# Patient Record
Sex: MALE | Race: WHITE | Employment: FULL TIME | URBAN - METROPOLITAN AREA
[De-identification: names, ages, dates, MRNs, and addresses within clinical notes are randomized per-mention and may not be internally consistent; named-entity substitution may affect disease eponyms.]

---

## 2019-12-09 ENCOUNTER — OFFICE VISIT (OUTPATIENT)
Dept: FAMILY MEDICINE CLINIC | Age: 54
End: 2019-12-09
Payer: COMMERCIAL

## 2019-12-09 VITALS
HEART RATE: 104 BPM | WEIGHT: 219 LBS | OXYGEN SATURATION: 98 % | DIASTOLIC BLOOD PRESSURE: 88 MMHG | HEIGHT: 73 IN | BODY MASS INDEX: 29.03 KG/M2 | SYSTOLIC BLOOD PRESSURE: 138 MMHG

## 2019-12-09 DIAGNOSIS — R00.2 HEART PALPITATIONS: Primary | ICD-10-CM

## 2019-12-09 DIAGNOSIS — G47.33 OSA (OBSTRUCTIVE SLEEP APNEA): ICD-10-CM

## 2019-12-09 DIAGNOSIS — E78.00 PURE HYPERCHOLESTEROLEMIA: ICD-10-CM

## 2019-12-09 DIAGNOSIS — R00.2 PALPITATION: ICD-10-CM

## 2019-12-09 DIAGNOSIS — R53.83 FATIGUE, UNSPECIFIED TYPE: ICD-10-CM

## 2019-12-09 PROCEDURE — 93000 ELECTROCARDIOGRAM COMPLETE: CPT | Performed by: FAMILY MEDICINE

## 2019-12-09 PROCEDURE — 36415 COLL VENOUS BLD VENIPUNCTURE: CPT | Performed by: FAMILY MEDICINE

## 2019-12-09 PROCEDURE — 99203 OFFICE O/P NEW LOW 30 MIN: CPT | Performed by: FAMILY MEDICINE

## 2019-12-09 PROCEDURE — G0444 DEPRESSION SCREEN ANNUAL: HCPCS | Performed by: FAMILY MEDICINE

## 2019-12-09 ASSESSMENT — ENCOUNTER SYMPTOMS
RHINORRHEA: 0
COUGH: 0
BLOOD IN STOOL: 0
EYE PAIN: 0
SINUS PRESSURE: 0
CONSTIPATION: 0
WHEEZING: 0
DIARRHEA: 0
ABDOMINAL PAIN: 0
CHEST TIGHTNESS: 0
SHORTNESS OF BREATH: 0
VOMITING: 0
EYE DISCHARGE: 0

## 2019-12-09 ASSESSMENT — PATIENT HEALTH QUESTIONNAIRE - PHQ9
7. TROUBLE CONCENTRATING ON THINGS, SUCH AS READING THE NEWSPAPER OR WATCHING TELEVISION: 0
SUM OF ALL RESPONSES TO PHQ QUESTIONS 1-9: 10
SUM OF ALL RESPONSES TO PHQ9 QUESTIONS 1 & 2: 4
9. THOUGHTS THAT YOU WOULD BE BETTER OFF DEAD, OR OF HURTING YOURSELF: 0
10. IF YOU CHECKED OFF ANY PROBLEMS, HOW DIFFICULT HAVE THESE PROBLEMS MADE IT FOR YOU TO DO YOUR WORK, TAKE CARE OF THINGS AT HOME, OR GET ALONG WITH OTHER PEOPLE: 0
4. FEELING TIRED OR HAVING LITTLE ENERGY: 3
2. FEELING DOWN, DEPRESSED OR HOPELESS: 3
SUM OF ALL RESPONSES TO PHQ QUESTIONS 1-9: 10
1. LITTLE INTEREST OR PLEASURE IN DOING THINGS: 1
3. TROUBLE FALLING OR STAYING ASLEEP: 3
6. FEELING BAD ABOUT YOURSELF - OR THAT YOU ARE A FAILURE OR HAVE LET YOURSELF OR YOUR FAMILY DOWN: 0
5. POOR APPETITE OR OVEREATING: 0
8. MOVING OR SPEAKING SO SLOWLY THAT OTHER PEOPLE COULD HAVE NOTICED. OR THE OPPOSITE, BEING SO FIGETY OR RESTLESS THAT YOU HAVE BEEN MOVING AROUND A LOT MORE THAN USUAL: 0

## 2019-12-13 ENCOUNTER — NURSE ONLY (OUTPATIENT)
Dept: FAMILY MEDICINE CLINIC | Age: 54
End: 2019-12-13
Payer: COMMERCIAL

## 2019-12-13 DIAGNOSIS — E78.00 PURE HYPERCHOLESTEROLEMIA: ICD-10-CM

## 2019-12-13 PROBLEM — R53.83 FATIGUE: Status: ACTIVE | Noted: 2019-12-13

## 2019-12-13 PROBLEM — R00.2 HEART PALPITATIONS: Status: ACTIVE | Noted: 2019-12-13

## 2019-12-13 LAB
CHOLESTEROL, TOTAL: 327 MG/DL (ref 0–199)
HDLC SERPL-MCNC: 47 MG/DL (ref 40–60)
LDL CHOLESTEROL CALCULATED: ABNORMAL MG/DL
LDL CHOLESTEROL DIRECT: 221 MG/DL
TRIGL SERPL-MCNC: 334 MG/DL (ref 0–150)
VLDLC SERPL CALC-MCNC: ABNORMAL MG/DL

## 2019-12-13 PROCEDURE — 36415 COLL VENOUS BLD VENIPUNCTURE: CPT | Performed by: FAMILY MEDICINE

## 2019-12-19 ENCOUNTER — OFFICE VISIT (OUTPATIENT)
Dept: FAMILY MEDICINE CLINIC | Age: 54
End: 2019-12-19
Payer: COMMERCIAL

## 2019-12-19 ENCOUNTER — OFFICE VISIT (OUTPATIENT)
Dept: CARDIOLOGY CLINIC | Age: 54
End: 2019-12-19
Payer: COMMERCIAL

## 2019-12-19 ENCOUNTER — TELEPHONE (OUTPATIENT)
Dept: CARDIOLOGY CLINIC | Age: 54
End: 2019-12-19

## 2019-12-19 VITALS
HEIGHT: 72 IN | WEIGHT: 219.8 LBS | SYSTOLIC BLOOD PRESSURE: 132 MMHG | DIASTOLIC BLOOD PRESSURE: 88 MMHG | OXYGEN SATURATION: 99 % | BODY MASS INDEX: 29.77 KG/M2 | HEART RATE: 102 BPM

## 2019-12-19 VITALS
HEART RATE: 111 BPM | BODY MASS INDEX: 29.82 KG/M2 | DIASTOLIC BLOOD PRESSURE: 86 MMHG | SYSTOLIC BLOOD PRESSURE: 124 MMHG | HEIGHT: 72 IN | WEIGHT: 220.2 LBS | OXYGEN SATURATION: 98 %

## 2019-12-19 DIAGNOSIS — R07.2 PRECORDIAL PAIN: Primary | ICD-10-CM

## 2019-12-19 DIAGNOSIS — R73.01 IFG (IMPAIRED FASTING GLUCOSE): ICD-10-CM

## 2019-12-19 DIAGNOSIS — R00.2 PALPITATIONS: ICD-10-CM

## 2019-12-19 DIAGNOSIS — R53.83 FATIGUE, UNSPECIFIED TYPE: ICD-10-CM

## 2019-12-19 DIAGNOSIS — R06.02 SOB (SHORTNESS OF BREATH): ICD-10-CM

## 2019-12-19 DIAGNOSIS — E78.01 FAMILIAL HYPERCHOLESTEROLEMIA: Primary | ICD-10-CM

## 2019-12-19 DIAGNOSIS — Z12.5 SCREENING PSA (PROSTATE SPECIFIC ANTIGEN): ICD-10-CM

## 2019-12-19 LAB
A/G RATIO: 1.5 (ref 1.1–2.2)
ALBUMIN SERPL-MCNC: 4.6 G/DL (ref 3.4–5)
ALP BLD-CCNC: 82 U/L (ref 40–129)
ALT SERPL-CCNC: 21 U/L (ref 10–40)
ANION GAP SERPL CALCULATED.3IONS-SCNC: 18 MMOL/L (ref 3–16)
AST SERPL-CCNC: 24 U/L (ref 15–37)
BILIRUB SERPL-MCNC: 0.3 MG/DL (ref 0–1)
BUN BLDV-MCNC: 16 MG/DL (ref 7–20)
CALCIUM SERPL-MCNC: 10.4 MG/DL (ref 8.3–10.6)
CHLORIDE BLD-SCNC: 94 MMOL/L (ref 99–110)
CO2: 24 MMOL/L (ref 21–32)
CREAT SERPL-MCNC: 0.9 MG/DL (ref 0.9–1.3)
GFR AFRICAN AMERICAN: >60
GFR NON-AFRICAN AMERICAN: >60
GLOBULIN: 3 G/DL
GLUCOSE BLD-MCNC: 523 MG/DL (ref 70–99)
HCT VFR BLD CALC: 45.7 % (ref 40.5–52.5)
HEMOGLOBIN: 15.5 G/DL (ref 13.5–17.5)
MCH RBC QN AUTO: 29.5 PG (ref 26–34)
MCHC RBC AUTO-ENTMCNC: 33.9 G/DL (ref 31–36)
MCV RBC AUTO: 87 FL (ref 80–100)
PDW BLD-RTO: 13.3 % (ref 12.4–15.4)
PLATELET # BLD: 254 K/UL (ref 135–450)
PMV BLD AUTO: 9.1 FL (ref 5–10.5)
POTASSIUM SERPL-SCNC: 4.6 MMOL/L (ref 3.5–5.1)
PROSTATE SPECIFIC ANTIGEN: 0.67 NG/ML (ref 0–4)
RBC # BLD: 5.25 M/UL (ref 4.2–5.9)
SODIUM BLD-SCNC: 136 MMOL/L (ref 136–145)
T4 FREE: 1.3 NG/DL (ref 0.9–1.8)
TOTAL PROTEIN: 7.6 G/DL (ref 6.4–8.2)
TSH REFLEX: 4.41 UIU/ML (ref 0.27–4.2)
WBC # BLD: 4.5 K/UL (ref 4–11)

## 2019-12-19 PROCEDURE — 0296T PR EXT ECG > 48HR TO 21 DAY RCRD W/CONECT INTL RCRD: CPT | Performed by: INTERNAL MEDICINE

## 2019-12-19 PROCEDURE — 36415 COLL VENOUS BLD VENIPUNCTURE: CPT | Performed by: FAMILY MEDICINE

## 2019-12-19 PROCEDURE — 99213 OFFICE O/P EST LOW 20 MIN: CPT | Performed by: FAMILY MEDICINE

## 2019-12-19 PROCEDURE — 99204 OFFICE O/P NEW MOD 45 MIN: CPT | Performed by: INTERNAL MEDICINE

## 2019-12-19 RX ORDER — ATORVASTATIN CALCIUM 20 MG/1
20 TABLET, FILM COATED ORAL DAILY
Qty: 30 TABLET | Refills: 2 | Status: SHIPPED | OUTPATIENT
Start: 2019-12-19 | End: 2020-01-08 | Stop reason: SDUPTHER

## 2019-12-19 ASSESSMENT — ENCOUNTER SYMPTOMS: SHORTNESS OF BREATH: 1

## 2019-12-20 LAB
ESTIMATED AVERAGE GLUCOSE: 366.6 MG/DL
HBA1C MFR BLD: 14.4 %

## 2019-12-20 RX ORDER — METFORMIN HYDROCHLORIDE 500 MG/1
500 TABLET, EXTENDED RELEASE ORAL 2 TIMES DAILY WITH MEALS
Qty: 60 TABLET | Refills: 1 | Status: SHIPPED | OUTPATIENT
Start: 2019-12-20 | End: 2020-01-08 | Stop reason: SDUPTHER

## 2020-01-03 PROCEDURE — 0298T PR EXT ECG > 48HR TO 21 DAY REVIEW AND INTERPRETATN: CPT | Performed by: INTERNAL MEDICINE

## 2020-01-08 ENCOUNTER — OFFICE VISIT (OUTPATIENT)
Dept: FAMILY MEDICINE CLINIC | Age: 55
End: 2020-01-08
Payer: COMMERCIAL

## 2020-01-08 VITALS
SYSTOLIC BLOOD PRESSURE: 122 MMHG | OXYGEN SATURATION: 99 % | BODY MASS INDEX: 29.96 KG/M2 | HEIGHT: 72 IN | HEART RATE: 104 BPM | DIASTOLIC BLOOD PRESSURE: 84 MMHG | WEIGHT: 221.2 LBS

## 2020-01-08 PROBLEM — E11.65 UNCONTROLLED TYPE 2 DIABETES MELLITUS WITH HYPERGLYCEMIA (HCC): Status: ACTIVE | Noted: 2020-01-08

## 2020-01-08 PROCEDURE — 99213 OFFICE O/P EST LOW 20 MIN: CPT | Performed by: FAMILY MEDICINE

## 2020-01-08 RX ORDER — BLOOD-GLUCOSE METER
KIT MISCELLANEOUS
Qty: 1 KIT | Refills: 0 | Status: SHIPPED | OUTPATIENT
Start: 2020-01-08

## 2020-01-08 RX ORDER — LANCETS 30 GAUGE
EACH MISCELLANEOUS
Qty: 100 EACH | Refills: 5 | Status: SHIPPED | OUTPATIENT
Start: 2020-01-08

## 2020-01-08 RX ORDER — ATORVASTATIN CALCIUM 20 MG/1
20 TABLET, FILM COATED ORAL DAILY
Qty: 90 TABLET | Refills: 1 | Status: SHIPPED | OUTPATIENT
Start: 2020-01-08 | End: 2020-02-08 | Stop reason: SDUPTHER

## 2020-01-08 RX ORDER — METFORMIN HYDROCHLORIDE 500 MG/1
1000 TABLET, EXTENDED RELEASE ORAL 2 TIMES DAILY WITH MEALS
Qty: 360 TABLET | Refills: 1 | Status: SHIPPED | OUTPATIENT
Start: 2020-01-08 | End: 2020-02-08 | Stop reason: SDUPTHER

## 2020-01-08 RX ORDER — GLUCOSAMINE HCL/CHONDROITIN SU 500-400 MG
CAPSULE ORAL
Qty: 100 STRIP | Refills: 5 | Status: SHIPPED | OUTPATIENT
Start: 2020-01-08

## 2020-01-08 ASSESSMENT — PATIENT HEALTH QUESTIONNAIRE - PHQ9
SUM OF ALL RESPONSES TO PHQ9 QUESTIONS 1 & 2: 0
1. LITTLE INTEREST OR PLEASURE IN DOING THINGS: 0
2. FEELING DOWN, DEPRESSED OR HOPELESS: 0
SUM OF ALL RESPONSES TO PHQ QUESTIONS 1-9: 0
SUM OF ALL RESPONSES TO PHQ QUESTIONS 1-9: 0

## 2020-01-08 NOTE — PROGRESS NOTES
Subjective:      Patient ID: Frederic Cody is a 62 y.o. male. HPI    Chief Complaint   Patient presents with    2 Week Follow-Up     Patient is here for a two week follow up for his elevated glucose, A1c on 12/19/19 was 14. 4.   here for f/u. Tolerating metformin. No diarrhea  States feels better. Stopped soda pop. Water only    Lab Review   Office Visit on 12/19/2019   Component Date Value    TSH 12/19/2019 4.41*    WBC 12/19/2019 4.5     RBC 12/19/2019 5.25     Hemoglobin 12/19/2019 15.5     Hematocrit 12/19/2019 45.7     MCV 12/19/2019 87.0     MCH 12/19/2019 29.5     MCHC 12/19/2019 33.9     RDW 12/19/2019 13.3     Platelets 62/49/4714 254     MPV 12/19/2019 9.1     Sodium 12/19/2019 136     Potassium 12/19/2019 4.6     Chloride 12/19/2019 94*    CO2 12/19/2019 24     Anion Gap 12/19/2019 18*    Glucose 12/19/2019 523*    BUN 12/19/2019 16     CREATININE 12/19/2019 0.9     GFR Non- 12/19/2019 >60     GFR  12/19/2019 >60     Calcium 12/19/2019 10.4     Total Protein 12/19/2019 7.6     Alb 12/19/2019 4.6     Albumin/Globulin Ratio 12/19/2019 1.5     Total Bilirubin 12/19/2019 0.3     Alkaline Phosphatase 12/19/2019 82     ALT 12/19/2019 21     AST 12/19/2019 24     Globulin 12/19/2019 3.0     Hemoglobin A1C 12/19/2019 14.4     eAG 12/19/2019 366.6     PSA 12/19/2019 0.67     T4 Free 12/19/2019 1.3    Nurse Only on 12/13/2019   Component Date Value    Cholesterol, Total 12/13/2019 327*    Triglycerides 12/13/2019 334*    HDL 12/13/2019 47     LDL Calculated 12/13/2019 see below     VLDL Cholesterol Calcula* 12/13/2019 see below     LDL Direct 12/13/2019 221*     Review of Systems   Constitutional: Positive for fatigue. Genitourinary: Positive for frequency. Objective:   Physical Exam  Vitals signs reviewed. Constitutional:       Appearance: Normal appearance. Neurological:      General: No focal deficit present.       Mental

## 2020-01-23 ENCOUNTER — TELEPHONE (OUTPATIENT)
Dept: CARDIOLOGY CLINIC | Age: 55
End: 2020-01-23

## 2020-01-23 ENCOUNTER — HOSPITAL ENCOUNTER (OUTPATIENT)
Dept: CARDIOLOGY | Age: 55
Discharge: HOME | End: 2020-01-23
Payer: COMMERCIAL

## 2020-01-23 LAB
LV EF: 60 %
LVEF MODALITY: NORMAL

## 2020-01-23 PROCEDURE — 93320 DOPPLER ECHO COMPLETE: CPT

## 2020-01-23 PROCEDURE — 93351 STRESS TTE COMPLETE: CPT

## 2020-02-10 RX ORDER — ATORVASTATIN CALCIUM 20 MG/1
20 TABLET, FILM COATED ORAL DAILY
Qty: 90 TABLET | Refills: 1 | Status: SHIPPED | OUTPATIENT
Start: 2020-02-10 | End: 2020-04-20 | Stop reason: SDUPTHER

## 2020-02-10 RX ORDER — METFORMIN HYDROCHLORIDE 500 MG/1
1000 TABLET, EXTENDED RELEASE ORAL 2 TIMES DAILY WITH MEALS
Qty: 360 TABLET | Refills: 1 | Status: SHIPPED | OUTPATIENT
Start: 2020-02-10 | End: 2020-04-20 | Stop reason: SDUPTHER

## 2020-02-10 NOTE — TELEPHONE ENCOUNTER
These medications were sent to the pharmacy on 1/8/20 as a 90 day supply. These refills are not due yet.

## 2020-02-26 ENCOUNTER — OFFICE VISIT (OUTPATIENT)
Dept: PULMONOLOGY | Age: 55
End: 2020-02-26
Payer: COMMERCIAL

## 2020-02-26 VITALS
HEART RATE: 78 BPM | BODY MASS INDEX: 28.36 KG/M2 | RESPIRATION RATE: 16 BRPM | SYSTOLIC BLOOD PRESSURE: 132 MMHG | TEMPERATURE: 98.2 F | WEIGHT: 214 LBS | OXYGEN SATURATION: 98 % | HEIGHT: 73 IN | DIASTOLIC BLOOD PRESSURE: 88 MMHG

## 2020-02-26 PROCEDURE — 99204 OFFICE O/P NEW MOD 45 MIN: CPT | Performed by: NURSE PRACTITIONER

## 2020-02-26 ASSESSMENT — SLEEP AND FATIGUE QUESTIONNAIRES
HOW LIKELY ARE YOU TO NOD OFF OR FALL ASLEEP WHILE SITTING AND TALKING TO SOMEONE: 0
HOW LIKELY ARE YOU TO NOD OFF OR FALL ASLEEP WHEN YOU ARE A PASSENGER IN A CAR FOR AN HOUR WITHOUT A BREAK: 2
HOW LIKELY ARE YOU TO NOD OFF OR FALL ASLEEP WHILE SITTING INACTIVE IN A PUBLIC PLACE: 1
HOW LIKELY ARE YOU TO NOD OFF OR FALL ASLEEP WHILE LYING DOWN TO REST IN THE AFTERNOON WHEN CIRCUMSTANCES PERMIT: 2
HOW LIKELY ARE YOU TO NOD OFF OR FALL ASLEEP WHILE SITTING AND READING: 1
HOW LIKELY ARE YOU TO NOD OFF OR FALL ASLEEP WHILE SITTING QUIETLY AFTER LUNCH WITHOUT ALCOHOL: 1
ESS TOTAL SCORE: 8
HOW LIKELY ARE YOU TO NOD OFF OR FALL ASLEEP WHILE WATCHING TV: 1
HOW LIKELY ARE YOU TO NOD OFF OR FALL ASLEEP IN A CAR, WHILE STOPPED FOR A FEW MINUTES IN TRAFFIC: 0
NECK CIRCUMFERENCE (INCHES): 17

## 2020-02-26 NOTE — LETTER
· Complications of JEN if not treated were discussed with patient patient to include systemic hypertension, pulmonary hypertension, cardiovascular morbidities, car accidents and all cause mortality. Discussed pathophysiology of JEN with patient today- video shown in office. Patient education handout provided regarding sleep tips and CPAP cleaning recommendations       If you have questions, please do not hesitate to call me. I look forward to following Daryl Hayward along with you.     Sincerely,        KOLTON Lindo - CNP    CC providers:  Georgette Wright [address]

## 2020-02-26 NOTE — PROGRESS NOTES
Patient ID: Sharan Abel is a 62 y.o. male who is being seen today for   Chief Complaint   Patient presents with    New Patient     JEN     Referring: Dr. Nat You    HPI:   Sharan Abel is a 62 y.o. male in office for JEN evaluation. He states he was diagnosed with JEN 2011. States used CPAP initially, states it was a pain to clean, states just slowly stopped using it. States he did feel better when using CPAP. States he has lost about 35 lbs since last sleep study however still wakes gasping, snores and poor sleep, feels he still has sleep apnea, wants to get treatment. Patient reports snoring at night for the past 10-15 years. Wakes self snoring. Has witnessed apnea. Wakes up at night choking and gasping for air. No restorative sleep. Sometimes dry mouth upon awakening. Fatigue and tiredness during the day. Bedtime 10 pm and rise time is 630 am. It takes 30-90 minutes to fall asleep-just lays there, states tosses and turns. No TV in bedroom. 1-2 nocturia. Wakes up 1-2 times at night. It takes few-unknown minutes to fall back a sleep- might get up and watches TV. Takes no nap during the day. No headache in am. No car wrecks or near wrecks because of the sleepiness. No nodding off while driving. No weight gain. Some forgetfulness, no decreased concentration. No nasal congestion at night. Drinks no caffinated beverages per day. No significant alcohol. No restless feelings in legs at night. No teeth grinding. No nightmares. No sleep walking. No night time panic attacks. No narcotics. No drug abuse. No history of depression. No history of anxiety. No history of atrial fibrillation. +history of DM. No history of HTN. No history of ischemic heart disease. No history of stroke. ESS is 8. No smoking. No known FH for JEN, RLS or narcolepsy.      Sleep Medicine 2/26/2020   Sitting and reading 1   Watching TV 1   Sitting, inactive in a public place (e.g. a theatre or a meeting) 1   As a passenger 5    Lancets MISC, (Please fill for brand per insurance coverage)  Test Daily  DX: E11.9, Disp: 100 each, Rfl: 5    Multiple Vitamin (THERA) TABS, Take 1 tablet by mouth daily. , Disp: , Rfl:     fish oil-omega-3 fatty acids 1000 MG capsule, Take 2 g by mouth daily. , Disp: , Rfl:     Loratadine (CLARITIN) 10 MG CAPS, Take 10 mg by mouth daily as needed (allergies) , Disp: , Rfl:       Review of Systems  Constitutional: Negative for fever  HENT: Negative for sore throat  Eyes: Negative for redness   Respiratory: Negative for dyspnea, cough  Cardiovascular: Negative for chest pain  Gastrointestinal: Negative for vomiting, diarrhea   Genitourinary: Negative for hematuria   Musculoskeletal: +arthralgias   Skin: Negative for rash  Neurological: Negative for syncope  Hematological: Negative for adenopathy  Psychiatric/Behavorial: Negative for anxiety      Objective:   PHYSICAL EXAM:  /88 (Site: Right Upper Arm, Position: Sitting)   Pulse 78   Temp 98.2 °F (36.8 °C) (Oral)   Resp 16   Ht 6' 1\" (1.854 m)   Wt 214 lb (97.1 kg)   SpO2 98%   BMI 28.23 kg/m²     Physical Exam  Gen: No acute distress. BMI of 28.23  Eyes: PERRL. No sclera icterus. No conjunctival injection. ENT: No discharge. Pharynx clear. Mallampati class IV. Large tongue. Neck: Trachea midline. No obvious mass. Neck circumference 17\"  Resp: No accessory muscle use. No crackles. No wheezes. No rhonchi. CV: Regular rate. Regular rhythm. No murmur or rub. No LE edema. GI: Non-tender. Non-distended. Skin: Warm and dry. No nodule on exposed extremities. Lymph: No cervical LAD. No supraclavicular LAD. M/S: No cyanosis. No obvious joint deformity. Neuro: Awake. Alert. Moves all four extremities. Psych: Oriented x 3. No anxiety. DATA:   2/1/2011 PSG AHI 82.2, low SPO2 74%  2/19/2011 titration-JEN adequately controlled with CPAP 11 cm H2O    CPap compliance data:    Assessment:       · Severe JEN.   No current treatment  · Snoring  · Observed sleep apnea   · Hypersomnia   · Sleep onset and maintenance insomnia-psychophysiological hyperarousal, poor sleep hygiene, untreated severe JEN likely contributing  · DM        Plan:      · Discussed options. Patient would like to proceed with trial of auto CPAP 8-16 cm H2O. Will order new auto capable machine as current machine does not have auto capabilities and is older unit. · Treatment options were discussed with patient for JEN, including CPAP therapy, oral appliances, hypoglossal nerve stimulator, and upper airway surgery. Patient would like to proceed with CPAP. · Advised to use CPAP 6-8 hrs at night and during naps. · Replacement of mask, tubing, head straps every 3-6 months or sooner if damaged. · Patient instructed to contact [employer] for any mask, tubing or machine trouble shooting if problems arise. · Sleep hygiene  · Cognitive behavioral therapy was discussed with patient including stimulus control and sleep restriction  · Recommend set bed/wake times, no naps in the daytime, no TV if wakes at night  · Avoid sedatives, alcohol and caffeinated drinks at bed time. · No driving motorized vehicles or operating heavy machinery while fatigue, drowsy or sleepy. · Weight loss is also recommended as a long-term intervention. · Complications of JEN if not treated were discussed with patient patient to include systemic hypertension, pulmonary hypertension, cardiovascular morbidities, car accidents and all cause mortality. Discussed pathophysiology of JEN with patient today- video shown in office.   Patient education handout provided regarding sleep tips and CPAP cleaning recommendations

## 2020-02-26 NOTE — PATIENT INSTRUCTIONS
.Please keep all of your future appointments scheduled by [address], [facility] office. Out of respect for other patients and providers, you may be asked to reschedule your appointment if you arrive later than your scheduled appointment time. Appointments cancelled less than 24hrs in advance will be considered a no show. Patients with three missed appointments within 1 year or four missed appointments within 2 years can be dismissed from the practice. You may receive a survey regarding the care you received during your visit. Your input is valuable to us. We encourage you to complete and return your survey. We hope you will choose us in the future for your healthcare needs. Remember to bring your sleep machine, cord and mask to each appointment    You should have a follow up appointment scheduled with a sleep medicine provider within 12 months. Routine parts, masks, tubing and filters should all be obtainable from your DME (equipment) provider. Any problems related to mask fit, comfort or functioning of equipment should also be addressed directly with your DME provider. If you are unable to resolve problems, then please notify our office and schedule an appointment to be seen sooner than the usual follow up appointment. For all patients who are evaluated for sleep disorders, never drive or operate motorized equipment while sleepy, fatigued or groggy. Please bring in all of your sleep equipment to all of your appointments, including machine, mask, cords and hoses. Here are some tips to to getting better sleep  1- Avoid napping during the day: This will ensure you are tired at bedtime. If you have to take a nap, sleep less than one hour, before 3 pm.   2- Exercise regularly, but not right before bed: but the timing of the workout is important. Exercising in the morning or early afternoon will not interfere with sleep.   Exercising within two hours before bedtime can decrease bedding can prevent good sleep. Evaluate whether or not this is a source of your problem, and make appropriate changes. 13- Make sure your bed and bedroom are quiet and comfortable: A hot room can be uncomfortable. A cooler room, along with enough blankets to stay warm is recommended. Get a blackout shade or wear a slumber mask and wear earplugs or get a \"white noise\" machine for light and noise distractions. 14- Use sunlight to set your biological clock: When you get up in the morning, go outside and turn your face to the sun for 15 minutes. 13- Dont take your worries to bed: Leave worries about job, school, daily life, etc., behind when you go to bed. Some people find it useful to assign a \"worry period\" during the evening or afternoon for these issues. CPAP Equipment Cleaning and Disinfecting Schedule  Equipment Cleaning Frequency Instructions  Disinfecting Frequency   Non-Disposable Filters  Weekly Mild soapy water, Rinse, Air Dry Not Required   Disposable Filters Change as needed  2-4 weeks Do Not Wash Not Required   Hose/tubing Daily Mild soapy water, Rinse, Air Dry Once a week   Mask / Nasal Pillows Daily Mild soapy water, Rinse, Air Dry Once a week   Headgear Weekly Hand wash, Mild soapy water, Rinse, Dry  Not Required   Humidifier Daily Empty water daily  Mild soapy water, Rinse well, Air Dry  Once a week   CPAP Unit As Needed Dust with damp cloth,  No detergents or sprays Not Required         Disinfect (per schedule) with 1 part white vinegar and 3 parts water- soak mask and water chamber for 30 minutes every 1-2 weeks, more often if sick. Allow water/vinegar mixture to run through tubing. Allow all equipment to air dry. Drying Hints:   Always hang tubing away from direct sunlight, as this will cause the tubing to become yellow, brittle and crack over a period of time. DO NOT attach the wet tubing to your CPAP unit to blow-dry it. The moisture from the tubing can drain back into your machine. Moisture in your unit can cause sudden pressure increases or short circuits  DO's and DON'Ts:  - Don't use alcohol-based products to clean your mask, because it can cause the materials to become hard and brittle. - Don't put headgear in the washer or dryer  - Don't use any caustic or household cleaning solutions such as bleach on your CPAP   equipment.  - Do follow the recommended cleaning schedule. - Do change your disposable filter frequently. Adapted From: [employer]/cleaning. shtm. These are general suggestions for all models please follow specific s recommendations and specific instructions    Here are some tips to to getting better sleep  1- Avoid napping during the day: This will ensure you are tired at bedtime. If you have to take a nap, sleep less than one hour, before 3 pm.   2- Exercise regularly, but not right before bed: but the timing of the workout is important. Exercising in the morning or early afternoon will not interfere with sleep. Exercising within two hours before bedtime can decrease your ability to fall asleep. Regular exercise is recommended to help you deepen the sleep. 3- Avoid heavy, spicy, or sugary foods 4-6 hours before bedtime: These can affect your ability to stay asleep. 4- Have a light snack before bed: Having an empty stomach can interfere with your sleep. Dairy products and turkey contain tryptophan, which acts as a natural sleep inducer. 5- Stay away from caffeine, nicotine and alcohol at least 4-6 hours before bed: Caffeine and nicotine are stimulants that interfere with your ability to fall asleep. While alcohol has an immediate sleep-inducing effect, a few hours later, as alcohol levels in your blood start to fall, there is a stimulant effect and you will experience fragmented sleep.    6- Take a hot bath 90 minutes before bedtime:  A hot bath will raise your body temperature, but it is the drop in body temperature that may leave

## 2020-04-20 ENCOUNTER — TELEPHONE (OUTPATIENT)
Dept: FAMILY MEDICINE CLINIC | Age: 55
End: 2020-04-20

## 2020-04-20 RX ORDER — ATORVASTATIN CALCIUM 20 MG/1
20 TABLET, FILM COATED ORAL DAILY
Qty: 90 TABLET | Refills: 1 | Status: SHIPPED | OUTPATIENT
Start: 2020-04-20 | End: 2020-10-07

## 2020-04-20 RX ORDER — METFORMIN HYDROCHLORIDE 500 MG/1
1000 TABLET, EXTENDED RELEASE ORAL 2 TIMES DAILY WITH MEALS
Qty: 360 TABLET | Refills: 1 | Status: SHIPPED | OUTPATIENT
Start: 2020-04-20 | End: 2020-10-07

## 2020-05-14 ENCOUNTER — TELEPHONE (OUTPATIENT)
Dept: PULMONOLOGY | Age: 55
End: 2020-05-14

## 2020-05-18 ENCOUNTER — VIRTUAL VISIT (OUTPATIENT)
Dept: FAMILY MEDICINE CLINIC | Age: 55
End: 2020-05-18
Payer: COMMERCIAL

## 2020-05-18 VITALS — WEIGHT: 207 LBS | BODY MASS INDEX: 28.04 KG/M2 | HEIGHT: 72 IN

## 2020-05-18 PROCEDURE — 99213 OFFICE O/P EST LOW 20 MIN: CPT | Performed by: FAMILY MEDICINE

## 2020-05-18 ASSESSMENT — ENCOUNTER SYMPTOMS
EYE DISCHARGE: 0
DIARRHEA: 0
COUGH: 0
ABDOMINAL PAIN: 0
BLOOD IN STOOL: 0
RHINORRHEA: 0
EYE PAIN: 0
VOMITING: 0
SHORTNESS OF BREATH: 0
SINUS PRESSURE: 0
CHEST TIGHTNESS: 0
CONSTIPATION: 0
WHEEZING: 0

## 2020-05-18 NOTE — PROGRESS NOTES
Musculoskeletal:   [x] Normal gait with no signs of ataxia         [x] Normal range of motion of neck        [] Abnormal-       Neurological:        [x] No Facial Asymmetry (Cranial nerve 7 motor function) (limited exam to video visit)          [x] No gaze palsy        [] Abnormal-         Skin:        [x] No significant exanthematous lesions or discoloration noted on facial skin         [] Abnormal-            Psychiatric:       [x] Normal Affect [x] No Hallucinations        [] Abnormal-     Other pertinent observable physical exam findings-     ASSESSMENT/PLAN:  DM- check labs, inc farxiga to 10 mg  hld- check labs  Orders Placed This Encounter   Procedures    LIPID PANEL     Standing Status:   Future     Standing Expiration Date:   5/18/2021     Order Specific Question:   Is Patient Fasting?/# of Hours     Answer:   12    COMPREHENSIVE METABOLIC PANEL     Standing Status:   Future     Standing Expiration Date:   5/18/2021    HEMOGLOBIN A1C     Standing Status:   Future     Standing Expiration Date:   5/18/2021    Microalbumin / Creatinine Urine Ratio     Standing Status:   Future     Standing Expiration Date:   5/18/2021     No orders of the defined types were placed in this encounter. Zak Wyatt is a 62 y.o. male being evaluated by a Virtual Visit (video visit) encounter to address concerns as mentioned above. A caregiver was present when appropriate. Due to this being a TeleHealth encounter (During VTZ- public health emergency), evaluation of the following organ systems was limited: Vitals/Constitutional/EENT/Resp/CV/GI//MS/Neuro/Skin/Heme-Lymph-Imm.   Pursuant to the emergency declaration under the [address] [place] [address] authority and the [employer] and [employer] Act, this Virtual Visit was conducted with patient's (and/or legal guardian's) consent, to reduce the patient's risk of exposure to COVID-19 and

## 2020-05-18 NOTE — PATIENT INSTRUCTIONS

## 2020-05-26 DIAGNOSIS — E78.01 FAMILIAL HYPERCHOLESTEROLEMIA: ICD-10-CM

## 2020-05-26 DIAGNOSIS — E11.65 UNCONTROLLED TYPE 2 DIABETES MELLITUS WITH HYPERGLYCEMIA (HCC): ICD-10-CM

## 2020-05-26 LAB
A/G RATIO: 1.8 (ref 1.1–2.2)
ALBUMIN SERPL-MCNC: 4.5 G/DL (ref 3.4–5)
ALP BLD-CCNC: 68 U/L (ref 40–129)
ALT SERPL-CCNC: 18 U/L (ref 10–40)
ANION GAP SERPL CALCULATED.3IONS-SCNC: 14 MMOL/L (ref 3–16)
AST SERPL-CCNC: 31 U/L (ref 15–37)
BILIRUB SERPL-MCNC: 0.5 MG/DL (ref 0–1)
BUN BLDV-MCNC: 17 MG/DL (ref 7–20)
CALCIUM SERPL-MCNC: 9.5 MG/DL (ref 8.3–10.6)
CHLORIDE BLD-SCNC: 98 MMOL/L (ref 99–110)
CHOLESTEROL, TOTAL: 118 MG/DL (ref 0–199)
CO2: 23 MMOL/L (ref 21–32)
CREAT SERPL-MCNC: 0.9 MG/DL (ref 0.9–1.3)
CREATININE URINE: 94.4 MG/DL (ref 39–259)
GFR AFRICAN AMERICAN: >60
GFR NON-AFRICAN AMERICAN: >60
GLOBULIN: 2.5 G/DL
GLUCOSE BLD-MCNC: 145 MG/DL (ref 70–99)
HDLC SERPL-MCNC: 47 MG/DL (ref 40–60)
LDL CHOLESTEROL CALCULATED: 56 MG/DL
MICROALBUMIN UR-MCNC: <1.2 MG/DL
MICROALBUMIN/CREAT UR-RTO: NORMAL MG/G (ref 0–30)
POTASSIUM SERPL-SCNC: 4.4 MMOL/L (ref 3.5–5.1)
SODIUM BLD-SCNC: 135 MMOL/L (ref 136–145)
TOTAL PROTEIN: 7 G/DL (ref 6.4–8.2)
TRIGL SERPL-MCNC: 77 MG/DL (ref 0–150)
VLDLC SERPL CALC-MCNC: 15 MG/DL

## 2020-05-27 ENCOUNTER — TELEPHONE (OUTPATIENT)
Dept: PULMONOLOGY | Age: 55
End: 2020-05-27

## 2020-05-27 ENCOUNTER — VIRTUAL VISIT (OUTPATIENT)
Dept: PULMONOLOGY | Age: 55
End: 2020-05-27
Payer: COMMERCIAL

## 2020-05-27 LAB
ESTIMATED AVERAGE GLUCOSE: 174.3 MG/DL
HBA1C MFR BLD: 7.7 %

## 2020-05-27 PROCEDURE — 99213 OFFICE O/P EST LOW 20 MIN: CPT | Performed by: NURSE PRACTITIONER

## 2020-05-27 ASSESSMENT — SLEEP AND FATIGUE QUESTIONNAIRES
HOW LIKELY ARE YOU TO NOD OFF OR FALL ASLEEP WHILE SITTING QUIETLY AFTER LUNCH WITHOUT ALCOHOL: 1
HOW LIKELY ARE YOU TO NOD OFF OR FALL ASLEEP WHILE SITTING INACTIVE IN A PUBLIC PLACE: 0
HOW LIKELY ARE YOU TO NOD OFF OR FALL ASLEEP WHILE SITTING AND TALKING TO SOMEONE: 0
HOW LIKELY ARE YOU TO NOD OFF OR FALL ASLEEP WHILE SITTING AND READING: 1
HOW LIKELY ARE YOU TO NOD OFF OR FALL ASLEEP WHEN YOU ARE A PASSENGER IN A CAR FOR AN HOUR WITHOUT A BREAK: 1
ESS TOTAL SCORE: 7
HOW LIKELY ARE YOU TO NOD OFF OR FALL ASLEEP IN A CAR, WHILE STOPPED FOR A FEW MINUTES IN TRAFFIC: 0
HOW LIKELY ARE YOU TO NOD OFF OR FALL ASLEEP WHILE LYING DOWN TO REST IN THE AFTERNOON WHEN CIRCUMSTANCES PERMIT: 3
HOW LIKELY ARE YOU TO NOD OFF OR FALL ASLEEP WHILE WATCHING TV: 1

## 2020-05-27 NOTE — PROGRESS NOTES
MA Communication: The following orders are received by verbal communication from Raz Nixon CNP.     Orders include:  Order faxed to [employer]       30 day compliance (see phone note)       6 mo fu scheduled 12/17/20

## 2020-05-27 NOTE — PROGRESS NOTES
Patient ID: Nitin Nguyen is a 62 y.o. male who is being seen today for   Chief Complaint   Patient presents with    Sleep Apnea     31-90 day     Referring: Dr. Edna Cameron    HPI:   Nitin Nguyen is a 62 y.o. male for televideo appointment via video and audio doxy. me virtual visit for JEN follow up. He restarted CPAP after last office visit. States he is doing good with CPAP. States he is not as tired since using CPAP. Patient is using CPAP 5 hrs/night. Using humidifier. No snoring on CPAP. The pressure is well tolerated. The mask is comfortable- nasal mask dreamwear. No mask leak. No significant daytime sleepiness. No nodding off when driving. No dry nose or throat. No fatigue. Bedtime is 10 pm and rise time is 645 am. Sleep onset is 15 minutes. Wakes up 2 times at night total. 2 nocturia. It takes usually few minutes to fall back a sleep, sometime is restless at night. States sometimes does not put CPAP back on after using restroom at night. Rare naps during the day. No headache in am. No weight gain. No caffienated beverages during the day. No alcohol. ESS is 7      Initial HPI 2/26/20  Nitin Nguyen is a 62 y.o. male in office for JEN evaluation. He states he was diagnosed with JEN 2011. States used CPAP initially, states it was a pain to clean, states just slowly stopped using it. States he did feel better when using CPAP. States he has lost about 35 lbs since last sleep study however still wakes gasping, snores and poor sleep, feels he still has sleep apnea, wants to get treatment. Patient reports snoring at night for the past 10-15 years. Wakes self snoring. Has witnessed apnea. Wakes up at night choking and gasping for air. No restorative sleep. Sometimes dry mouth upon awakening. Fatigue and tiredness during the day. Bedtime 10 pm and rise time is 630 am. It takes 30-90 minutes to fall asleep-just lays there, states tosses and turns. No TV in bedroom. 1-2 nocturia.  Wakes up 1-2 times at night. It takes few-unknown minutes to fall back a sleep- might get up and watches TV. Takes no nap during the day. No headache in am. No car wrecks or near wrecks because of the sleepiness. No nodding off while driving. No weight gain. Some forgetfulness, no decreased concentration. No nasal congestion at night. Drinks no caffinated beverages per day. No significant alcohol. No restless feelings in legs at night. No teeth grinding. No nightmares. No sleep walking. No night time panic attacks. No narcotics. No drug abuse. No history of depression. No history of anxiety. No history of atrial fibrillation. +history of DM. No history of HTN. No history of ischemic heart disease. No history of stroke. ESS is 8. No smoking. No known FH for JEN, RLS or narcolepsy. Sleep Medicine 5/27/2020 2/26/2020   Sitting and reading 1 1   Watching TV 1 1   Sitting, inactive in a public place (e.g. a theatre or a meeting) 0 1   As a passenger in a car for an hour without a break 1 2   Lying down to rest in the afternoon when circumstances permit 3 2   Sitting and talking to someone 0 0   Sitting quietly after a lunch without alcohol 1 1   In a car, while stopped for a few minutes in traffic 0 0   Total score 7 8   Neck circumference - 17       Past Medical History:  Past Medical History:   Diagnosis Date    Acute bronchitis     Asthma     Chest pain 03/2012       /         8/2006    GXT Abnl/Myocardial Perfusion=Neg /  Coronary angiogram= Normal    Cough     Depression     Elevated lipids     Hyperlipemia     IFG (impaired fasting glucose)     Obesity     Rectal bleeding     Sleep apnea        Past Surgical History:        Procedure Laterality Date    ANGIOPLASTY      COLONOSCOPY  2012    Neg    OTHER SURGICAL HISTORY      groin removed       Allergies:  has No Known Allergies. Social History:    TOBACCO:   reports that he has never smoked.  He has never used smokeless tobacco.  ETOH:   reports current hrs/night with 87.5% compliance and AHI 12.2 within this time frame. 29/32 days with greater than 4 hours of machine use. 90% pressure 10.2 cm H20 on auto CPAP 8-16 cm H2O     Compliance download report from 4/15/20 to 5/15/20 reviewed today by me and showed patient is using machine 4:39 hrs/night with 67.7% compliance and AHI 9.6 within this time frame. 20/31days with greater than 4 hours of machine use. 31/31 days with any CPAP use  90% pressure 10 cm H20 on auto CPAP 8-16 cm H2O     Assessment:       · Severe JEN. Auto CPAP 8-16 cm H2O.  initially compliant, has decreased over past month, patient using CPAP nightly, not always getting enough time each night, residual AHI 9.6  · Snoring- resolved on CPAP  · Observed sleep apnea -resolved on CPAP  · Hypersomnia -improving  · Sleep onset and maintenance insomnia-psychophysiological hyperarousal, poor sleep hygiene, severe JEN likely contributing- improving  · DM        Plan:      · Send order to change to auto CPAP 9-13 cm H2O. Follow AHI and adjust pressure if needed  · Discussed CPAP acclimation techniques, recommend keep CPAP on all night  · Advised to use CPAP 6-8 hrs at night and during naps. · Replacement of mask, tubing, head straps every 3-6 months or sooner if damaged. · Patient instructed to contact DME company for any mask, tubing or machine trouble shooting if problems arise. · Sleep hygiene  · Cognitive behavioral therapy was discussed with patient including stimulus control and sleep restriction  · Recommend set bed/wake times, no naps in the daytime, no TV if wakes at night  · Avoid sedatives, alcohol and caffeinated drinks at bed time. Discussed effects on alcohol and sleep- initially can worsen JEN, and then can cause decrease in total sleep time and increased wakefulness in the second half of sleep  · No driving motorized vehicles or operating heavy machinery while fatigue, drowsy or sleepy.    · Weight loss is also recommended as a

## 2020-05-29 ENCOUNTER — TELEPHONE (OUTPATIENT)
Dept: FAMILY MEDICINE CLINIC | Age: 55
End: 2020-05-29

## 2020-06-30 ENCOUNTER — PATIENT MESSAGE (OUTPATIENT)
Dept: FAMILY MEDICINE CLINIC | Age: 55
End: 2020-06-30

## 2020-07-07 NOTE — TELEPHONE ENCOUNTER
CPAP compliance report from 6/4/2020-7/1/2020 on auto CPAP 9-13 cm H2O reviewed. Compliance is good 86%. AHI has improved however still slightly elevated 7.4.       Please send order to change to auto CPAP 10-14 cm H2O and look for new compliance report about 30 days after pressure is changed

## 2020-07-17 NOTE — TELEPHONE ENCOUNTER
Order reviewed and signed.   Please look for new compliance report about 30 days after pressure is changed

## 2020-08-20 NOTE — TELEPHONE ENCOUNTER
CPAP compliance report from 7/16/2020-8/14/2020 on auto CPAP 10-14 cm H2O reviewed. Compliance is poor 26%. AHI is improving however still slightly elevated 6.6.     Please send order to change to auto CPAP 11-15 cm H2O and get new compliance report about 30 days after pressure is changed     Please call patient and inform should use CPAP at least 4 hours a night and ideally all night every night for maximum benefit

## 2020-08-21 NOTE — TELEPHONE ENCOUNTER
I have attempted without success to contact this patient by phone to will try again later unable to leave message vm box full.

## 2020-08-25 NOTE — TELEPHONE ENCOUNTER
Spoke w/pt.  Given message with verbal understanding,  Order pended  Request a 30 day CR around 09/28/2020   ELVIS Lovett

## 2020-10-01 ENCOUNTER — OFFICE VISIT (OUTPATIENT)
Dept: FAMILY MEDICINE CLINIC | Age: 55
End: 2020-10-01
Payer: COMMERCIAL

## 2020-10-01 VITALS
BODY MASS INDEX: 27.39 KG/M2 | OXYGEN SATURATION: 99 % | WEIGHT: 202.2 LBS | DIASTOLIC BLOOD PRESSURE: 84 MMHG | HEIGHT: 72 IN | TEMPERATURE: 98.4 F | HEART RATE: 93 BPM | SYSTOLIC BLOOD PRESSURE: 126 MMHG

## 2020-10-01 PROBLEM — R07.2 PRECORDIAL PAIN: Status: RESOLVED | Noted: 2019-12-19 | Resolved: 2020-10-01

## 2020-10-01 LAB
ALBUMIN SERPL-MCNC: 4.8 G/DL (ref 3.4–5)
ALP BLD-CCNC: 77 U/L (ref 40–129)
ALT SERPL-CCNC: 31 U/L (ref 10–40)
AST SERPL-CCNC: 35 U/L (ref 15–37)
BILIRUB SERPL-MCNC: 0.3 MG/DL (ref 0–1)
BILIRUBIN DIRECT: <0.2 MG/DL (ref 0–0.3)
BILIRUBIN, INDIRECT: NORMAL MG/DL (ref 0–1)
CHOLESTEROL, TOTAL: 155 MG/DL (ref 0–199)
HDLC SERPL-MCNC: 57 MG/DL (ref 40–60)
LDL CHOLESTEROL CALCULATED: 76 MG/DL
TOTAL PROTEIN: 7.5 G/DL (ref 6.4–8.2)
TRIGL SERPL-MCNC: 110 MG/DL (ref 0–150)
VLDLC SERPL CALC-MCNC: 22 MG/DL

## 2020-10-01 PROCEDURE — 36415 COLL VENOUS BLD VENIPUNCTURE: CPT | Performed by: FAMILY MEDICINE

## 2020-10-01 PROCEDURE — 90686 IIV4 VACC NO PRSV 0.5 ML IM: CPT | Performed by: FAMILY MEDICINE

## 2020-10-01 PROCEDURE — 3051F HG A1C>EQUAL 7.0%<8.0%: CPT | Performed by: FAMILY MEDICINE

## 2020-10-01 PROCEDURE — 90471 IMMUNIZATION ADMIN: CPT | Performed by: FAMILY MEDICINE

## 2020-10-01 PROCEDURE — 99213 OFFICE O/P EST LOW 20 MIN: CPT | Performed by: FAMILY MEDICINE

## 2020-10-01 ASSESSMENT — ENCOUNTER SYMPTOMS
SHORTNESS OF BREATH: 0
CONSTIPATION: 0
EYE REDNESS: 0
VOMITING: 0
COUGH: 0
BLOOD IN STOOL: 0
COLOR CHANGE: 0
CHEST TIGHTNESS: 0
DIARRHEA: 0
EYE PAIN: 0
WHEEZING: 0
SORE THROAT: 0
SINUS PAIN: 0
ABDOMINAL PAIN: 0
APNEA: 0

## 2020-10-01 NOTE — PATIENT INSTRUCTIONS

## 2020-10-01 NOTE — PROGRESS NOTES
Vaccine Information Sheet, \"Influenza - Inactivated\"  given to Bhargav Cotton, or parent/legal guardian of  Bhargav Cotton and verbalized understanding. Patient responses:    Have you ever had a reaction to a flu vaccine? No  Do you have any current illness? No  Have you ever had Guillian Stanton Syndrome? No  Do you have a serious allergy to any of the follow: Neomycin, Polymyxin, Thimerosal, eggs or egg products? No    Flu vaccine given per order. Please see immunization tab. Risks and benefits explained. Current VIS given.

## 2020-10-01 NOTE — TELEPHONE ENCOUNTER
CPAP compliance report from 8/27/2020-9/27/2020 reviewed. Data appears to stop on 9/6/2020. Is patient using CPAP? Could also be a modem issue. Please get report for last 30 days if patient states he is using CPAP.     AHI still appears slightly elevated however would like to have full report to truly evaluate

## 2020-10-01 NOTE — PROGRESS NOTES
Subjective:      Patient ID: Jacky Jorgensen is a 60 y.o. male. HPI  Chief Complaint   Patient presents with    Diabetes     Pt is here for follow up on DM and FBW   Keniavon Terry is a 60year old male presenting to clinic for a diabetes checkup and fasting blood work. Today he has no acute complaints to discuss. He states he has been 4-5 miles daily and has been eating healthier. He states he has lost a total of around 70lbs since incorporating diet and exercise. He has been taking metformin and farxiga for his diabetes and has been tolerating them well. He states he will schedule his diabetic eye exam next week. He has seen the dentist this year. He states he had a colonoscopy without abnormal findings five years ago but records were not in epic so we are contacting the office of where he said he had it done at to locate those records. He has a history of hypercholesterolemia which he is taking lipitor for and is tolerating it well. Today he received the influenza vaccine and is also interest in receiving the shingles vaccine. No new chest pain or shortness of breath.   Jacky Jorgensen is a 60 y.o. male with the following history as recorded in [facility]:  Patient Active Problem List    Diagnosis Date Noted    Hyperlipidemia 04/18/2012     Priority: High    Asthma      Priority: Medium    Acute bronchitis      Priority: Low    Obesity      Priority: Low    Uncontrolled type 2 diabetes mellitus with hyperglycemia (HCC) 01/08/2020    Precordial pain 12/19/2019    Palpitations 12/13/2019    Fatigue 12/13/2019    Severe obstructive sleep apnea 12/09/2019    Rectal bleeding     Abnormal stress test 04/18/2012     Current Outpatient Medications   Medication Sig Dispense Refill    dapagliflozin (FARXIGA) 10 MG tablet Take 10 mg by mouth every morning      metFORMIN (GLUCOPHAGE-XR) 500 MG extended release tablet Take 2 tablets by mouth 2 times daily (with meals) 360 tablet 1    atorvastatin (LIPITOR) 20 MG tablet Take 1 tablet by mouth daily 90 tablet 1    glucose monitoring kit (FREESTYLE) monitoring kit (Please fill for brand per insurance coverage)   Test daily  DX: E11.9 1 kit 0    blood glucose monitor strips (Please fill for brand per insurance coverage)  Test Daily  DX: E11.9 100 strip 5    Lancets MISC (Please fill for brand per insurance coverage)  Test Daily  DX: E11.9 100 each 5    Multiple Vitamin (THERA) TABS Take 1 tablet by mouth daily.  fish oil-omega-3 fatty acids 1000 MG capsule Take 2 g by mouth daily.  Loratadine (CLARITIN) 10 MG CAPS Take 10 mg by mouth daily as needed (allergies)       dapagliflozin (FARXIGA) 10 MG tablet Take 1 tablet by mouth every morning (Patient not taking: Reported on 10/1/2020) 90 tablet 1     No current facility-administered medications for this visit. Allergies: Patient has no known allergies.   Past Medical History:   Diagnosis Date    Acute bronchitis     Asthma     Chest pain 03/2012       /         8/2006    GXT Abnl/Myocardial Perfusion=Neg /  Coronary angiogram= Normal    Cough     Depression     Elevated lipids     Hyperlipemia     IFG (impaired fasting glucose)     Obesity     Rectal bleeding     Sleep apnea      Past Surgical History:   Procedure Laterality Date    ANGIOPLASTY      COLONOSCOPY  2012    Neg    OTHER SURGICAL HISTORY      groin removed     Family History   Problem Relation Age of Onset    Stroke Mother     Kidney Disease Father         brice    Heart Disease Father 28        MI    High Blood Pressure Father     Asthma Father     Lupus Sister     Mult Sclerosis Sister     Diabetes Paternal Grandfather      Social History     Tobacco Use    Smoking status: Never Smoker    Smokeless tobacco: Never Used   Substance Use Topics    Alcohol use: Yes     Comment: occ     Vitals:    10/01/20 0829   BP: 126/84   Site: Right Upper Arm   Position: Sitting   Pulse: 93   Temp: 98.4 °F (36.9 °C)   SpO2: 99% Weight: 202 lb 3.2 oz (91.7 kg)   Height: 6' (1.829 m)     Body mass index is 27.42 kg/m². Wt Readings from Last 3 Encounters:   10/01/20 202 lb 3.2 oz (91.7 kg)   05/18/20 207 lb (93.9 kg)   02/26/20 214 lb (97.1 kg)     BP Readings from Last 3 Encounters:   10/01/20 126/84   02/26/20 132/88   01/08/20 122/84        Review of Systems   Constitutional: Negative for chills, diaphoresis, fatigue and unexpected weight change. HENT: Negative for congestion, dental problem, ear discharge, ear pain, postnasal drip, sinus pain, sore throat and tinnitus. Eyes: Negative for pain and redness. Respiratory: Negative for apnea, cough, chest tightness, shortness of breath and wheezing. Cardiovascular: Negative for chest pain and palpitations. Gastrointestinal: Negative for abdominal pain, blood in stool, constipation, diarrhea and vomiting. Endocrine: Negative for polydipsia and polyuria. Genitourinary: Negative for difficulty urinating, flank pain, frequency and urgency. Musculoskeletal: Negative for gait problem and myalgias. Skin: Negative for color change and wound. Neurological: Negative for dizziness, syncope, numbness and headaches. Hematological: Negative for adenopathy. Psychiatric/Behavioral: Negative for behavioral problems and hallucinations. The patient is not nervous/anxious. Objective:   Physical Exam  Constitutional:       General: He is not in acute distress. Appearance: Normal appearance. He is not ill-appearing. HENT:      Head: Normocephalic and atraumatic. Right Ear: Tympanic membrane normal. There is no impacted cerumen. Left Ear: Tympanic membrane normal. There is no impacted cerumen. Nose: Nose normal.      Mouth/Throat:      Mouth: Mucous membranes are moist.      Pharynx: No posterior oropharyngeal erythema. Eyes:      General:         Right eye: No discharge. Left eye: No discharge.       Extraocular Movements: Extraocular movements intact. Neck:      Musculoskeletal: Normal range of motion. No muscular tenderness. Cardiovascular:      Rate and Rhythm: Normal rate and regular rhythm. Pulses: Normal pulses. Heart sounds: Normal heart sounds. No murmur. No gallop. Pulmonary:      Effort: Pulmonary effort is normal. No respiratory distress. Breath sounds: No wheezing or rales. Abdominal:      Palpations: Abdomen is soft. Tenderness: There is no abdominal tenderness. There is no guarding. Musculoskeletal: Normal range of motion. General: No swelling or deformity. Skin:     General: Skin is warm. Findings: No bruising or lesion. Neurological:      General: No focal deficit present. Mental Status: He is alert. Sensory: No sensory deficit. Motor: No weakness.       Comments: Diabetic foot exam- 10/10 left foot, 10/10 right foot, No sensory deficits, No skin wounds or ulcerations   Psychiatric:         Mood and Affect: Mood normal.         Behavior: Behavior normal.         Assessment:      Diabetes-Well controlled on metformin, farxiga, and lifestyle modifications  Hypercholesterolemia-Well controlled on lipitor          Plan:      Continue current diabetes regimen  Continue statin therapy   Continue lifestyle modifications to manage diabetes and hypercholesterolemia  Annual flu shot   Fasting blood work  Orders Placed This Encounter   Procedures    INFLUENZA, QUADV, 3 YRS AND OLDER, IM PF, PREFILL SYR OR SDV, 0.5ML (AFLURIA QUADV, PF)    LIPID PANEL     Order Specific Question:   Is Patient Fasting?/# of Hours     Answer:   12    HEPATIC FUNCTION PANEL    HEMOGLOBIN A1C     DIABETES FOOT EXAM               MARIELOS Pena [address] WES[place], DO

## 2020-10-02 LAB
ESTIMATED AVERAGE GLUCOSE: 151.3 MG/DL
HBA1C MFR BLD: 6.9 %

## 2020-10-07 RX ORDER — ATORVASTATIN CALCIUM 20 MG/1
TABLET, FILM COATED ORAL
Qty: 90 TABLET | Refills: 1 | Status: SHIPPED | OUTPATIENT
Start: 2020-10-07 | End: 2021-02-26 | Stop reason: SDUPTHER

## 2020-10-07 RX ORDER — METFORMIN HYDROCHLORIDE 500 MG/1
TABLET, EXTENDED RELEASE ORAL
Qty: 360 TABLET | Refills: 1 | Status: SHIPPED | OUTPATIENT
Start: 2020-10-07 | End: 2021-02-26 | Stop reason: SDUPTHER

## 2020-10-07 NOTE — TELEPHONE ENCOUNTER
Rose Marie Hunter [phone] (home)    is requesting refill(s) of medication Metformin to preferred pharmacy [facility] [address] 10/1/20 (pertaining to medication)   Last refill 4/20/20 (per medication requested)  Next office visit scheduled or attempted Yes  Date 1/11/21  If No, reason     Atorvastatin-4/20/20

## 2020-11-11 NOTE — TELEPHONE ENCOUNTER
CPAP compliance report from 10/10/2020-11/10/2020 on auto CPAP 11-15 cm H2O reviewed. Compliance is suboptimal 22%. AHI is still slightly elevated but improving 6.1.     Continue at current pressure for now and will reevaluate AHI/compliance on appointment 12/17/2020

## 2020-12-12 LAB — DIABETIC RETINOPATHY: NEGATIVE

## 2020-12-15 RX ORDER — DAPAGLIFLOZIN 10 MG/1
TABLET, FILM COATED ORAL
Qty: 90 TABLET | Refills: 1 | Status: SHIPPED | OUTPATIENT
Start: 2020-12-15 | End: 2021-02-26 | Stop reason: SDUPTHER

## 2021-02-26 ENCOUNTER — OFFICE VISIT (OUTPATIENT)
Dept: FAMILY MEDICINE CLINIC | Age: 56
End: 2021-02-26
Payer: COMMERCIAL

## 2021-02-26 ENCOUNTER — TELEPHONE (OUTPATIENT)
Dept: FAMILY MEDICINE CLINIC | Age: 56
End: 2021-02-26

## 2021-02-26 VITALS
HEIGHT: 72 IN | WEIGHT: 216.4 LBS | DIASTOLIC BLOOD PRESSURE: 82 MMHG | HEART RATE: 90 BPM | TEMPERATURE: 97.8 F | OXYGEN SATURATION: 99 % | SYSTOLIC BLOOD PRESSURE: 126 MMHG | BODY MASS INDEX: 29.31 KG/M2

## 2021-02-26 DIAGNOSIS — Z00.00 ANNUAL PHYSICAL EXAM: Primary | ICD-10-CM

## 2021-02-26 DIAGNOSIS — Z12.5 SCREENING PSA (PROSTATE SPECIFIC ANTIGEN): ICD-10-CM

## 2021-02-26 DIAGNOSIS — Z23 NEED FOR SHINGLES VACCINE: ICD-10-CM

## 2021-02-26 DIAGNOSIS — Z23 NEED FOR DIPHTHERIA-TETANUS-PERTUSSIS (TDAP) VACCINE: ICD-10-CM

## 2021-02-26 DIAGNOSIS — E11.9 TYPE 2 DIABETES MELLITUS WITHOUT COMPLICATION, WITHOUT LONG-TERM CURRENT USE OF INSULIN (HCC): ICD-10-CM

## 2021-02-26 DIAGNOSIS — E78.01 FAMILIAL HYPERCHOLESTEROLEMIA: ICD-10-CM

## 2021-02-26 PROBLEM — R00.2 PALPITATIONS: Status: RESOLVED | Noted: 2019-12-13 | Resolved: 2021-02-26

## 2021-02-26 PROBLEM — E11.65 UNCONTROLLED TYPE 2 DIABETES MELLITUS WITH HYPERGLYCEMIA (HCC): Status: RESOLVED | Noted: 2020-01-08 | Resolved: 2021-02-26

## 2021-02-26 PROBLEM — R53.83 FATIGUE: Status: RESOLVED | Noted: 2019-12-13 | Resolved: 2021-02-26

## 2021-02-26 PROCEDURE — 36415 COLL VENOUS BLD VENIPUNCTURE: CPT | Performed by: FAMILY MEDICINE

## 2021-02-26 PROCEDURE — 90472 IMMUNIZATION ADMIN EACH ADD: CPT | Performed by: FAMILY MEDICINE

## 2021-02-26 PROCEDURE — 90471 IMMUNIZATION ADMIN: CPT | Performed by: FAMILY MEDICINE

## 2021-02-26 PROCEDURE — 90715 TDAP VACCINE 7 YRS/> IM: CPT | Performed by: FAMILY MEDICINE

## 2021-02-26 PROCEDURE — 99396 PREV VISIT EST AGE 40-64: CPT | Performed by: FAMILY MEDICINE

## 2021-02-26 PROCEDURE — 90750 HZV VACC RECOMBINANT IM: CPT | Performed by: FAMILY MEDICINE

## 2021-02-26 RX ORDER — ATORVASTATIN CALCIUM 20 MG/1
TABLET, FILM COATED ORAL
Qty: 90 TABLET | Refills: 1 | Status: SHIPPED | OUTPATIENT
Start: 2021-02-26 | End: 2021-09-10

## 2021-02-26 RX ORDER — ALBUTEROL SULFATE 90 UG/1
2 AEROSOL, METERED RESPIRATORY (INHALATION) 4 TIMES DAILY PRN
Qty: 1 INHALER | Refills: 5 | Status: SHIPPED | OUTPATIENT
Start: 2021-02-26 | End: 2022-03-24 | Stop reason: SDUPTHER

## 2021-02-26 RX ORDER — METFORMIN HYDROCHLORIDE 500 MG/1
TABLET, EXTENDED RELEASE ORAL
Qty: 360 TABLET | Refills: 1 | Status: SHIPPED | OUTPATIENT
Start: 2021-02-26 | End: 2021-10-05

## 2021-02-26 ASSESSMENT — ENCOUNTER SYMPTOMS
WHEEZING: 0
EYE PAIN: 0
CHEST TIGHTNESS: 0
SHORTNESS OF BREATH: 0
COUGH: 0
DIARRHEA: 0
VOMITING: 0
EYE DISCHARGE: 0
RHINORRHEA: 0
CONSTIPATION: 0
SINUS PRESSURE: 0
ABDOMINAL PAIN: 0
BLOOD IN STOOL: 0

## 2021-02-26 ASSESSMENT — PATIENT HEALTH QUESTIONNAIRE - PHQ9
SUM OF ALL RESPONSES TO PHQ QUESTIONS 1-9: 0
SUM OF ALL RESPONSES TO PHQ QUESTIONS 1-9: 0
SUM OF ALL RESPONSES TO PHQ9 QUESTIONS 1 & 2: 0
2. FEELING DOWN, DEPRESSED OR HOPELESS: 0
SUM OF ALL RESPONSES TO PHQ QUESTIONS 1-9: 0
1. LITTLE INTEREST OR PLEASURE IN DOING THINGS: 0

## 2021-02-26 NOTE — TELEPHONE ENCOUNTER
They will be mailing the colonoscopy report to us because if they try to fax, the report will not be as clear.

## 2021-02-26 NOTE — PROGRESS NOTES
Subjective:      Patient ID: Cheng Medina is a 60 y.o. male. HPI  Chief Complaint   Patient presents with    Annual Exam     Patient is here for a physical, he is fasting for blood work. For yearly checkup. Non-smoker. Up-to-date on dental and vision exams  Does exercise. Does have admit to weight gain since last year. colonscopy- Dr. Anna Johnson exam: padminiafterse Medina is a 60 y.o. male with the following history as recorded in [employer]:  Patient Active Problem List    Diagnosis Date Noted    Hyperlipidemia 04/18/2012     Priority: High    Asthma      Priority: Medium    Obesity      Priority: Low    Uncontrolled type 2 diabetes mellitus with hyperglycemia ([facility] [address] 01/08/2020    Palpitations 12/13/2019    Fatigue 12/13/2019    Severe obstructive sleep apnea 12/09/2019    Abnormal stress test 04/18/2012     Current Outpatient Medications   Medication Sig Dispense Refill    dapagliflozin (FARXIGA) 10 MG tablet TAKE 1 TABLET EVERY MORNING 90 tablet 1    atorvastatin (LIPITOR) 20 MG tablet TAKE 1 TABLET DAILY 90 tablet 1    metFORMIN (GLUCOPHAGE-XR) 500 MG extended release tablet TAKE 2 TABLETS 2 TIMES     DAILY WITH MEALS 360 tablet 1    TURMERIC PO Take by mouth daily      glucose monitoring kit (FREESTYLE) monitoring kit (Please fill for brand per insurance coverage)   Test daily  DX: E11.9 1 kit 0    blood glucose monitor strips (Please fill for brand per insurance coverage)  Test Daily  DX: E11.9 100 strip 5    Lancets MISC (Please fill for brand per insurance coverage)  Test Daily  DX: E11.9 100 each 5    Multiple Vitamin (THERA) TABS Take 1 tablet by mouth daily.  fish oil-omega-3 fatty acids 1000 MG capsule Take 2 g by mouth daily.  Loratadine (CLARITIN) 10 MG CAPS Take 10 mg by mouth daily as needed (allergies)        No current facility-administered medications for this visit. Allergies: Patient has no known allergies.   Past Medical History: Diagnosis Date    Acute bronchitis     Asthma     Chest pain 03/2012       /         8/2006    GXT Abnl/Myocardial Perfusion=Neg /  Coronary angiogram= Normal    Cough     Depression     Elevated lipids     Hyperlipemia     IFG (impaired fasting glucose)     Obesity     Rectal bleeding     Sleep apnea      Past Surgical History:   Procedure Laterality Date    ANGIOPLASTY      COLONOSCOPY  2012    Neg    OTHER SURGICAL HISTORY      groin removed     Family History   Problem Relation Age of Onset    Stroke Mother     Kidney Disease Father         brice    Heart Disease Father 28        MI    High Blood Pressure Father     Asthma Father     Lupus Sister     Mult Sclerosis Sister     Diabetes Paternal Grandfather      Social History     Tobacco Use    Smoking status: Never Smoker    Smokeless tobacco: Never Used   Substance Use Topics    Alcohol use: Yes     Comment: occ     Vitals:    02/26/21 0905   BP: 126/82   Pulse: 90   Temp: 97.8 °F (36.6 °C)   TempSrc: Temporal   SpO2: 99%   Weight: 216 lb 6.4 oz (98.2 kg)   Height: 6' (1.829 m)     Body mass index is 29.35 kg/m². Wt Readings from Last 3 Encounters:   02/26/21 216 lb 6.4 oz (98.2 kg)   10/01/20 202 lb 3.2 oz (91.7 kg)   05/18/20 207 lb (93.9 kg)     BP Readings from Last 3 Encounters:   02/26/21 126/82   10/01/20 126/84   02/26/20 132/88        Review of Systems   Constitutional: Negative for fatigue, fever and unexpected weight change. HENT: Negative for congestion, ear discharge, ear pain, hearing loss, rhinorrhea and sinus pressure. Eyes: Negative for pain, discharge and visual disturbance. Respiratory: Negative for cough, chest tightness, shortness of breath and wheezing. Cardiovascular: Negative for chest pain, palpitations and leg swelling. Gastrointestinal: Negative for abdominal pain, blood in stool, constipation, diarrhea and vomiting. Genitourinary: Negative for difficulty urinating, dysuria and hematuria. Neurological: Negative for dizziness, seizures, syncope and headaches. Psychiatric/Behavioral: Negative for dysphoric mood and sleep disturbance. The patient is not nervous/anxious. Objective:   Physical Exam  Vitals signs reviewed. Constitutional:       General: He is not in acute distress. Appearance: Normal appearance. He is well-developed. He is not ill-appearing. HENT:      Head: Normocephalic and atraumatic. Right Ear: Tympanic membrane and external ear normal. There is no impacted cerumen. Left Ear: Tympanic membrane and external ear normal. There is no impacted cerumen. Nose: Nose normal.      Mouth/Throat:      Mouth: Mucous membranes are moist.      Pharynx: No oropharyngeal exudate or posterior oropharyngeal erythema. Eyes:      General:         Right eye: No discharge. Left eye: No discharge. Extraocular Movements: Extraocular movements intact. Pupils: Pupils are equal, round, and reactive to light. Neck:      Musculoskeletal: Normal range of motion and neck supple. No muscular tenderness. Thyroid: No thyromegaly. Cardiovascular:      Rate and Rhythm: Normal rate and regular rhythm. Pulses: Normal pulses. Heart sounds: Normal heart sounds. No murmur. No gallop. Pulmonary:      Effort: Pulmonary effort is normal. No respiratory distress. Breath sounds: Normal breath sounds. No wheezing or rales. Abdominal:      General: Bowel sounds are normal. There is no distension. Palpations: Abdomen is soft. Tenderness: There is no abdominal tenderness. There is no guarding or rebound. Musculoskeletal: Normal range of motion. General: No swelling or deformity. Lymphadenopathy:      Cervical: No cervical adenopathy. Skin:     General: Skin is warm. Findings: No bruising or lesion. Neurological:      General: No focal deficit present. Mental Status: He is alert and oriented to person, place, and time. Sensory: No sensory deficit. Motor: No weakness. Psychiatric:         Mood and Affect: Mood normal.         Behavior: Behavior normal.         Thought Content: Thought content normal.         Judgment: Judgment normal.         Assessment:      CPE  hld  DM- check lab      Plan:      Patient Counseling:  --Nutrition: Stressed importance of moderation in sodium/caffeine intake, saturated fat and cholesterol, caloric balance, sufficient intake of fresh fruits, vegetables, fiber, calcium, iron, and 1 mg of folate supplement per day (for females capable of pregnancy). --Exercise: Stressed the importance of regular exercise. --Dental health: Discussed importance of regular tooth brushing, flossing, and dental visits. --Immunizations reviewed. Daily sunscreen recommended. Yearly FSE discussed  --Discussed benefits of screening colonoscopy.     Orders Placed This Encounter   Procedures    [facility])    Tdap (age 6y and older) IM ([address] [place] [address] [facility])    HEMOGLOBIN A1C    PSA screening    LIPID PANEL     Order Specific Question:   Is Patient Fasting?/# of Hours     Answer:   12    COMPREHENSIVE METABOLIC PANEL    TSH with Reflex     Orders Placed This Encounter   Medications    dapagliflozin (FARXIGA) 10 MG tablet     Sig: TAKE 1 TABLET EVERY MORNING     Dispense:  90 tablet     Refill:  1    atorvastatin (LIPITOR) 20 MG tablet     Sig: TAKE 1 TABLET DAILY     Dispense:  90 tablet     Refill:  1    metFORMIN (GLUCOPHAGE-XR) 500 MG extended release tablet     Sig: TAKE 2 TABLETS 2 TIMES     DAILY WITH MEALS     Dispense:  360 tablet     Refill:  1    albuterol sulfate HFA (VENTOLIN HFA) 108 (90 Base) MCG/ACT inhaler     Sig: Inhale 2 puffs into the lungs 4 times daily as needed for Wheezing     Dispense:  1 Inhaler     Refill:  5             MARIELOS Pena [address] DO WESLY

## 2021-02-27 LAB
A/G RATIO: 1.7 (ref 1.1–2.2)
ALBUMIN SERPL-MCNC: 4.7 G/DL (ref 3.4–5)
ALP BLD-CCNC: 58 U/L (ref 40–129)
ALT SERPL-CCNC: 31 U/L (ref 10–40)
ANION GAP SERPL CALCULATED.3IONS-SCNC: 12 MMOL/L (ref 3–16)
AST SERPL-CCNC: 34 U/L (ref 15–37)
BILIRUB SERPL-MCNC: 0.4 MG/DL (ref 0–1)
BUN BLDV-MCNC: 16 MG/DL (ref 7–20)
CALCIUM SERPL-MCNC: 10 MG/DL (ref 8.3–10.6)
CHLORIDE BLD-SCNC: 103 MMOL/L (ref 99–110)
CHOLESTEROL, TOTAL: 167 MG/DL (ref 0–199)
CO2: 26 MMOL/L (ref 21–32)
CREAT SERPL-MCNC: 0.8 MG/DL (ref 0.9–1.3)
ESTIMATED AVERAGE GLUCOSE: 177.2 MG/DL
GFR AFRICAN AMERICAN: >60
GFR NON-AFRICAN AMERICAN: >60
GLOBULIN: 2.7 G/DL
GLUCOSE BLD-MCNC: 173 MG/DL (ref 70–99)
HBA1C MFR BLD: 7.8 %
HDLC SERPL-MCNC: 47 MG/DL (ref 40–60)
LDL CHOLESTEROL CALCULATED: 103 MG/DL
POTASSIUM SERPL-SCNC: 4.8 MMOL/L (ref 3.5–5.1)
PROSTATE SPECIFIC ANTIGEN: 0.77 NG/ML (ref 0–4)
SODIUM BLD-SCNC: 141 MMOL/L (ref 136–145)
TOTAL PROTEIN: 7.4 G/DL (ref 6.4–8.2)
TRIGL SERPL-MCNC: 84 MG/DL (ref 0–150)
TSH REFLEX: 3.78 UIU/ML (ref 0.27–4.2)
VLDLC SERPL CALC-MCNC: 17 MG/DL

## 2021-03-02 ENCOUNTER — TELEPHONE (OUTPATIENT)
Dept: PULMONOLOGY | Age: 56
End: 2021-03-02

## 2021-03-02 NOTE — TELEPHONE ENCOUNTER
Patient cancelled appointment on 3/3/21 with Betsy Holman for 6 month sleep. Reason: cancelled via mychart    Patient did reschedule appointment. Appointment rescheduled for 4/13/21     Last OV 5/27/2020    Assessment:       · Severe JEN. Auto CPAP 8-16 cm H2O.  initially compliant, has decreased over past month, patient using CPAP nightly, not always getting enough time each night, residual AHI 9.6  · Snoring- resolved on CPAP  · Observed sleep apnea -resolved on CPAP  · Hypersomnia -improving  · Sleep onset and maintenance insomnia-psychophysiological hyperarousal, poor sleep hygiene, severe JEN likely contributing- improving  · DM         Plan:      · Send order to change to auto CPAP 9-13 cm H2O. Follow AHI and adjust pressure if needed  · Discussed CPAP acclimation techniques, recommend keep CPAP on all night  · Advised to use CPAP 6-8 hrs at night and during naps. · Replacement of mask, tubing, head straps every 3-6 months or sooner if damaged. · Patient instructed to contact DME company for any mask, tubing or machine trouble shooting if problems arise. · Sleep hygiene  · Cognitive behavioral therapy was discussed with patient including stimulus control and sleep restriction  · Recommend set bed/wake times, no naps in the daytime, no TV if wakes at night  · Avoid sedatives, alcohol and caffeinated drinks at bed time. ·  Discussed effects on alcohol and sleep- initially can worsen JEN, and then can cause decrease in total sleep time and increased wakefulness in the second half of sleep  · No driving motorized vehicles or operating heavy machinery while fatigue, drowsy or sleepy. · Weight loss is also recommended as a long-term intervention. · Complications of JEN if not treated were discussed with patient patient to include systemic hypertension, pulmonary hypertension, cardiovascular morbidities, car accidents and all cause mortality.   · Patient education regarding sleep tips and CPAP cleaning recommendations         Follow up 6 months, sooner if needed     Consent for telehealth visit was obtained and is noted in chart        THIS VISIT WAS COMPLETED VIRTUALLY USING DOXY. JOSHUA Flores is a 62 y.o. male being evaluated by a Virtual Visit (video visit) encounter to address concerns as mentioned above.  A caregiver was present when appropriate. Due to this being a TeleHealth encounter (During [facility] emergency), evaluation of the following organ systems was limited: Vitals/Constitutional/EENT/Resp/CV/GI//MS/Neuro/Skin/Heme-Lymph-Imm.  Pursuant to the emergency declaration under the [facility], 1135 waiver authority and the [employer] Act, this Virtual Visit was conducted with patient's (and/or legal guardian's) consent, to reduce the patient's risk of exposure to COVID-19 and provide necessary medical care.  The patient (and/or legal guardian) has also been advised to contact this office for worsening conditions or problems, and seek emergency medical treatment and/or call 911 if deemed necessary.     Patient identification was verified at the start of the visit: Yes     Total time spent for this encounter: Not billed by time     Services were provided through a video synchronous discussion virtually to substitute for in-person clinic visit. Patient and provider were located at their individual homes.     --KOLTON Higginbotham - CNP on 5/27/2020 at 2:31 PM     An electronic signature was used to authenticate this note.         Other Notes  All notes     Progress Notes from Marcy Kumar MA  Instructions       Return in about 6 months (around 11/27/2020), or if symptoms worsen or fail to improve, for new symptoms.   Remember to bring all pulmonary medications to your next appointment with the office.      Please keep all of your future appointments scheduled by [facility] [place] [facility], [facility] office. Out of respect for other patients and providers, you may be asked to reschedule your appointment if you arrive later than your scheduled appointment time. Appointments cancelled less than 24hrs in advance will be considered a no show. Patients with three missed appointments within 1 year or four missed appointments within 2 years can be dismissed from the practice.        You may receive a survey regarding the care you received during your visit.  Your input is valuable to us.  We encourage you to complete and return your survey. We hope you will choose us in the future for your healthcare needs.         Sleep Hygiene. .. Tips for better sleep. ..      · Avoid naps. This will ensure you are sleepy at bedtime. If you have to take a nap, sleep less than 1 hour, before 3 pm.  · Sleep only when sleepy; this reduces the time you are awake in bed. · Regular exercise is recommended to help you deepen your sleep, but not within 4-6 hours of your bedtime. Timing of exercise is important, aim to exercise early in the morning or early afternoon. · A light snack may help you fall asleep. Warm milk and foods high in the amino acid tryptophan, such as bananas, may help you to sleep  · Be sure to avoid heavy, spicy or sugary foods 4-6 hours before bedtime and avoid at snack time. · Stay away from stimulants such as caffeine and nicotine for at least 4-6 hours before bed. Stimulants can interfere with your ability to fall asleep. Caffeine is found in tea, cola, coffee, cocoa and chocolate and is best avoided at bedtime. Nicotine is found in tobacco products. · Avoid alcohol 4-6 hours before bedtime. Alcohol has an immediate sleep-inducing effect, after a few hours when alcohol levels fall there is a stimulant or wake-up effect and will cause fragmented sleep. · Sleep rituals are important. Give your body clues it is time to slow down and sleep.  Examples include; yoga, deep breathing, listen to relaxing music, a hot bath or a few minutes of reading. · Have a fixed bedtime and awakening time, Even on weekends! You will feel better keeping a regular sleep cycle, even if you are retired or not working. · Get into your favorite sleep position. If not asleep in 30 minutes, get up and do something boring until you feel sleepy. Remember not to expose yourself to bright lights such as TV, phone or tablet screens. · Only use your bed for sleeping. Do not use your bed as an office, workroom or recreation room. · Use comfortable bedding. Uncomfortable bedding can prevent good sleep. · Ensure your bedroom is quiet and comfortable. A cooler room along with enough blankets to stay warm is recommended. If your room is too noisy, try a white noise machine. If too bright, try black out shades or an eye mask. · Dont take worries to bed. Leave worries about work, school etc. behind you when you go to bed. Some people find it helpful to assign a worry period in the evening or late afternoon to write down your worries and get them out of your system.      CPAP Equipment Cleaning and Disinfecting Schedule  Equipment Cleaning Frequency Instructions  Disinfecting Frequency   Non-Disposable Filters  Weekly Mild soapy water, Rinse, Air Dry Not Required   Disposable Filters Change as needed  2-4 weeks Do Not Wash Not Required   Hose/tubing Daily Mild soapy water, Rinse, Air Dry Once a week   Mask / Nasal Pillows Daily Mild soapy water, Rinse, Air Dry Once a week   Headgear Weekly Hand wash, Mild soapy water, Rinse, Dry  Not Required   Humidifier Daily Empty water daily  Mild soapy water, Rinse well, Air Dry  Once a week   CPAP Unit As Needed Dust with damp cloth,  No detergents or sprays Not Required             Disinfect (per schedule) with 1 part white vinegar and 3 parts water- soak mask and water chamber for 30 minutes every 1-2 weeks, more often if sick.   Allow water/vinegar mixture to run through on 5/27/20 at 14:57   BestPractice Advisories    Click to view BestPractice Advisory history   Encounter Messages    Read Composed From To  Subject   Y 5/20/2020  6:08 AM Margo Bains  Upcoming appointment at [facility] on 5/27/20   Y 2/26/2020 11:33 AM Margo Bains  Appointment Scheduled   Orders Placed     CPAP  Outpatient Medications at End of Encounter as of 5/27/2020    dapagliflozin (FARXIGA) 10 MG tablet (Taking) Take 10 mg by mouth every morning   metFORMIN (GLUCOPHAGE-XR) 500 MG extended release tablet (Taking) Take 2 tablets by mouth 2 times daily (with meals)   atorvastatin (LIPITOR) 20 MG tablet (Taking) Take 1 tablet by mouth daily   glucose monitoring kit (FREESTYLE) monitoring kit (Taking) (Please fill for brand per insurance coverage)   Test daily  DX: E11.9   blood glucose monitor strips (Taking) (Please fill for brand per insurance coverage)  Test Daily  DX: E11.9   Lancets MISC (Taking) (Please fill for brand per insurance coverage)  Test Daily  DX: E11.9   Multiple Vitamin (THERA) TABS (Taking) Take 1 tablet by mouth daily.     fish oil-omega-3 fatty acids 1000 MG capsule (Taking) Take 2 g by mouth daily.     Loratadine (CLARITIN) 10 MG CAPS (Taking) Take 10 mg by mouth daily as needed (allergies)    Visit Diagnoses       Severe obstructive sleep apnea     CPAP use counseling     Problem List

## 2021-03-28 PROBLEM — Z00.00 ANNUAL PHYSICAL EXAM: Status: RESOLVED | Noted: 2021-02-26 | Resolved: 2021-03-28

## 2021-04-30 ENCOUNTER — TELEPHONE (OUTPATIENT)
Dept: FAMILY MEDICINE CLINIC | Age: 56
End: 2021-04-30

## 2021-06-11 ENCOUNTER — NURSE ONLY (OUTPATIENT)
Dept: FAMILY MEDICINE CLINIC | Age: 56
End: 2021-06-11
Payer: COMMERCIAL

## 2021-06-11 DIAGNOSIS — Z23 NEED FOR SHINGLES VACCINE: Primary | ICD-10-CM

## 2021-06-11 PROCEDURE — 90471 IMMUNIZATION ADMIN: CPT | Performed by: FAMILY MEDICINE

## 2021-06-11 PROCEDURE — 90750 HZV VACC RECOMBINANT IM: CPT | Performed by: FAMILY MEDICINE

## 2021-06-21 ENCOUNTER — TELEPHONE (OUTPATIENT)
Dept: PULMONOLOGY | Age: 56
End: 2021-06-21

## 2021-06-21 NOTE — TELEPHONE ENCOUNTER
Appointment canceled for Hilaria Helms (<[id number]>)   Visit Type: OFFICE VISIT   Date        Time      Length    Provider                  Department   6/24/2021   10:00 AM  20 mins. KOLTON Hernandez - CNP     [facility]      Reason for Cancellation: Rescheduled      Patient Comments: Reschedule        Assessment: 5/27/20      · Severe JEN. Auto CPAP 8-16 cm H2O.  initially compliant, has decreased over past month, patient using CPAP nightly, not always getting enough time each night, residual AHI 9.6  · Snoring- resolved on CPAP  · Observed sleep apnea -resolved on CPAP  · Hypersomnia -improving  · Sleep onset and maintenance insomnia-psychophysiological hyperarousal, poor sleep hygiene, severe JEN likely contributing- improving  · DM         Plan:      · Send order to change to auto CPAP 9-13 cm H2O. Follow AHI and adjust pressure if needed  · Discussed CPAP acclimation techniques, recommend keep CPAP on all night  · Advised to use CPAP 6-8 hrs at night and during naps. · Replacement of mask, tubing, head straps every 3-6 months or sooner if damaged. · Patient instructed to contact [employer] company for any mask, tubing or machine trouble shooting if problems arise. · Sleep hygiene  · Cognitive behavioral therapy was discussed with patient including stimulus control and sleep restriction  · Recommend set bed/wake times, no naps in the daytime, no TV if wakes at night  · Avoid sedatives, alcohol and caffeinated drinks at bed time. ·  Discussed effects on alcohol and sleep- initially can worsen JEN, and then can cause decrease in total sleep time and increased wakefulness in the second half of sleep  · No driving motorized vehicles or operating heavy machinery while fatigue, drowsy or sleepy. · Weight loss is also recommended as a long-term intervention.     · Complications of JEN if not treated were discussed with patient patient to include systemic hypertension, pulmonary hypertension, cardiovascular morbidities, car accidents and all cause mortality. · Patient education regarding sleep tips and CPAP cleaning recommendations         Follow up 6 months, sooner if needed     Consent for telehealth visit was obtained and is noted in chart        THIS VISIT WAS COMPLETED VIRTUALLY USING DOXY. JOSHUA Contreras is a 62 y.o. male being evaluated by a Virtual Visit (video visit) encounter to address concerns as mentioned above.  A caregiver was present when appropriate. Due to this being a TeleHealth encounter (During VFAIL-29 public health emergency), evaluation of the following organ systems was limited: Vitals/Constitutional/EENT/Resp/CV/GI//MS/Neuro/Skin/Heme-Lymph-Imm.  Pursuant to the emergency declaration under the [facility], 1135 waiver authority and the [employer] Act, this Virtual Visit was conducted with patient's (and/or legal guardian's) consent, to reduce the patient's risk of exposure to COVID-19 and provide necessary medical care.  The patient (and/or legal guardian) has also been advised to contact this office for worsening conditions or problems, and seek emergency medical treatment and/or call 911 if deemed necessary.     Patient identification was verified at the start of the visit: Yes     Total time spent for this encounter: Not billed by time     Services were provided through a video synchronous discussion virtually to substitute for in-person clinic visit.  Patient and provider were located at their individual homes.     --KOLTON Champagne - CNP on 5/27/2020 at 2:31 PM     An electronic signature was used to authenticate this note.

## 2021-09-10 RX ORDER — ATORVASTATIN CALCIUM 20 MG/1
TABLET, FILM COATED ORAL
Qty: 90 TABLET | Refills: 1 | Status: SHIPPED | OUTPATIENT
Start: 2021-09-10 | End: 2022-03-24 | Stop reason: SDUPTHER

## 2021-09-10 NOTE — TELEPHONE ENCOUNTER
Ethel Cordon is requesting refill(s) farxiga, lipitor   Last OV 2/26/21 (pertaining to medication)  LR 2/26/21 (per medication requested)  Next office visit scheduled or attempted Yes   If no, reason:  9/28/21

## 2021-10-05 RX ORDER — METFORMIN HYDROCHLORIDE 500 MG/1
TABLET, EXTENDED RELEASE ORAL
Qty: 360 TABLET | Refills: 1 | Status: SHIPPED | OUTPATIENT
Start: 2021-10-05 | End: 2022-03-24 | Stop reason: SDUPTHER

## 2021-10-05 NOTE — TELEPHONE ENCOUNTER
Joy Hatfield is requesting refill(s)   Last OV 02/26/21 (pertaining to medication)  LR 02/26/21 (per medication requested)  Next office visit scheduled or attempted Yes   If no, reason:  10/21/21

## 2021-10-21 ENCOUNTER — OFFICE VISIT (OUTPATIENT)
Dept: FAMILY MEDICINE CLINIC | Age: 56
End: 2021-10-21
Payer: COMMERCIAL

## 2021-10-21 VITALS
SYSTOLIC BLOOD PRESSURE: 116 MMHG | BODY MASS INDEX: 29.31 KG/M2 | OXYGEN SATURATION: 97 % | HEART RATE: 95 BPM | HEIGHT: 72 IN | WEIGHT: 216.4 LBS | DIASTOLIC BLOOD PRESSURE: 80 MMHG

## 2021-10-21 DIAGNOSIS — E11.65 UNCONTROLLED TYPE 2 DIABETES MELLITUS WITH HYPERGLYCEMIA (HCC): Primary | ICD-10-CM

## 2021-10-21 DIAGNOSIS — Z23 NEED FOR INFLUENZA VACCINATION: ICD-10-CM

## 2021-10-21 PROBLEM — E11.9 TYPE 2 DIABETES MELLITUS WITHOUT COMPLICATION, WITHOUT LONG-TERM CURRENT USE OF INSULIN (HCC): Status: RESOLVED | Noted: 2021-02-26 | Resolved: 2021-10-21

## 2021-10-21 LAB
CREATININE URINE: 81.6 MG/DL (ref 39–259)
HBA1C MFR BLD: 8 %
MICROALBUMIN UR-MCNC: 1.3 MG/DL
MICROALBUMIN/CREAT UR-RTO: 15.9 MG/G (ref 0–30)

## 2021-10-21 PROCEDURE — 90686 IIV4 VACC NO PRSV 0.5 ML IM: CPT | Performed by: FAMILY MEDICINE

## 2021-10-21 PROCEDURE — 83036 HEMOGLOBIN GLYCOSYLATED A1C: CPT | Performed by: FAMILY MEDICINE

## 2021-10-21 PROCEDURE — 3052F HG A1C>EQUAL 8.0%<EQUAL 9.0%: CPT | Performed by: FAMILY MEDICINE

## 2021-10-21 PROCEDURE — 90471 IMMUNIZATION ADMIN: CPT | Performed by: FAMILY MEDICINE

## 2021-10-21 PROCEDURE — 99213 OFFICE O/P EST LOW 20 MIN: CPT | Performed by: FAMILY MEDICINE

## 2021-10-21 SDOH — ECONOMIC STABILITY: FOOD INSECURITY: WITHIN THE PAST 12 MONTHS, YOU WORRIED THAT YOUR FOOD WOULD RUN OUT BEFORE YOU GOT MONEY TO BUY MORE.: NEVER TRUE

## 2021-10-21 SDOH — ECONOMIC STABILITY: FOOD INSECURITY: WITHIN THE PAST 12 MONTHS, THE FOOD YOU BOUGHT JUST DIDN'T LAST AND YOU DIDN'T HAVE MONEY TO GET MORE.: NEVER TRUE

## 2021-10-21 ASSESSMENT — ENCOUNTER SYMPTOMS
NAUSEA: 0
CHEST TIGHTNESS: 0
ABDOMINAL PAIN: 0
DIARRHEA: 0
EYE PAIN: 0
SINUS PRESSURE: 0
EYE DISCHARGE: 0
SHORTNESS OF BREATH: 0
COUGH: 0
CONSTIPATION: 0
RHINORRHEA: 0
BLOOD IN STOOL: 0
WHEEZING: 0
VOMITING: 0

## 2021-10-21 ASSESSMENT — SOCIAL DETERMINANTS OF HEALTH (SDOH): HOW HARD IS IT FOR YOU TO PAY FOR THE VERY BASICS LIKE FOOD, HOUSING, MEDICAL CARE, AND HEATING?: NOT HARD AT ALL

## 2021-10-21 NOTE — PATIENT INSTRUCTIONS

## 2021-10-21 NOTE — PROGRESS NOTES
Vaccine Information Sheet, \"Influenza - Inactivated\"  given to Bhargav Cotton, or parent/legal guardian of  Bhargav Cotton and verbalized understanding. Patient responses:    Have you ever had a reaction to a flu vaccine? No  Do you have any current illness? No  Have you ever had Guillian Marks Syndrome? No  Do you have a serious allergy to any of the follow: Neomycin, Polymyxin, Thimerosal, eggs or egg products? No    Flu vaccine given per order. Please see immunization tab. Risks and benefits explained. Current VIS given.

## 2021-10-21 NOTE — PROGRESS NOTES
Subjective:      Patient ID: Faye Reed is a 60 y.o. male. HPI     Chief Complaint   Patient presents with    Diabetes     Patient is here for a 6 month followup, he is fasting for blood work.  Hyperlipidemia     T2DM - Diet has not been great (lots of life stressors). Was exercising every day since last visit, last 2 weeks low back has been hurting. Denies side effects from farxiga and metformin. HLD - Is not as consistent with statin. Denies any side effects from medications. Faye Reed is a 60 y.o. male with the following history as recorded in [facility]:  Patient Active Problem List    Diagnosis Date Noted    Hyperlipidemia 04/18/2012    Asthma     Obesity     Uncontrolled type 2 diabetes mellitus with hyperglycemia ([facility] [address] 01/08/2020    Severe obstructive sleep apnea 12/09/2019    Abnormal stress test 04/18/2012     Current Outpatient Medications   Medication Sig Dispense Refill    metFORMIN (GLUCOPHAGE-XR) 500 MG extended release tablet TAKE 2 TABLETS 2 TIMES     DAILY WITH MEALS 360 tablet 1    dapagliflozin (FARXIGA) 10 MG tablet TAKE 1 TABLET EVERY MORNING 90 tablet 1    atorvastatin (LIPITOR) 20 MG tablet TAKE 1 TABLET DAILY 90 tablet 1    TURMERIC PO Take by mouth daily      albuterol sulfate HFA (VENTOLIN HFA) 108 (90 Base) MCG/ACT inhaler Inhale 2 puffs into the lungs 4 times daily as needed for Wheezing 1 Inhaler 5    glucose monitoring kit (FREESTYLE) monitoring kit (Please fill for brand per insurance coverage)   Test daily  DX: E11.9 1 kit 0    blood glucose monitor strips (Please fill for brand per insurance coverage)  Test Daily  DX: E11.9 100 strip 5    Lancets MISC (Please fill for brand per insurance coverage)  Test Daily  DX: E11.9 100 each 5    Multiple Vitamin (THERA) TABS Take 1 tablet by mouth daily.  fish oil-omega-3 fatty acids 1000 MG capsule Take 2 g by mouth daily.         Loratadine (CLARITIN) 10 MG CAPS Take 10 mg by mouth daily as needed (allergies)        No current facility-administered medications for this visit. Allergies: Patient has no known allergies. Past Medical History:   Diagnosis Date    Acute bronchitis     Asthma     Chest pain 03/2012       /         8/2006    GXT Abnl/Myocardial Perfusion=Neg /  Coronary angiogram= Normal    Cough     Depression     Elevated lipids     Hyperlipemia     IFG (impaired fasting glucose)     Obesity     Rectal bleeding     Sleep apnea      Past Surgical History:   Procedure Laterality Date    ANGIOPLASTY      COLONOSCOPY  2012    Neg    OTHER SURGICAL HISTORY      groin removed     Family History   Problem Relation Age of Onset    Stroke Mother     Kidney Disease Father         brice    Heart Disease Father 28        MI    High Blood Pressure Father     Asthma Father     Lupus Sister     Mult Sclerosis Sister     Diabetes Paternal Grandfather      Social History     Tobacco Use    Smoking status: Never Smoker    Smokeless tobacco: Never Used   Substance Use Topics    Alcohol use: Yes     Comment: occ     Vitals:    10/21/21 0915   BP: 116/80   Pulse: 95   SpO2: 97%   Weight: 216 lb 6.4 oz (98.2 kg)   Height: 6' (1.829 m)     Body mass index is 29.35 kg/m². Wt Readings from Last 3 Encounters:   10/21/21 216 lb 6.4 oz (98.2 kg)   02/26/21 216 lb 6.4 oz (98.2 kg)   10/01/20 202 lb 3.2 oz (91.7 kg)     BP Readings from Last 3 Encounters:   10/21/21 116/80   02/26/21 126/82   10/01/20 126/84      Results for POC orders placed in visit on 10/21/21   POCT glycosylated hemoglobin (Hb A1C)   Result Value Ref Range    Hemoglobin A1C 8.0 %       Review of Systems   Constitutional: Negative for appetite change, fatigue, fever and unexpected weight change. HENT: Negative for congestion, ear discharge, ear pain, hearing loss, rhinorrhea and sinus pressure. Eyes: Negative for pain, discharge and visual disturbance.    Respiratory: Negative for cough, chest tightness, shortness of breath and wheezing. Cardiovascular: Negative for chest pain, palpitations and leg swelling. Gastrointestinal: Negative for abdominal pain, blood in stool, constipation, diarrhea, nausea and vomiting. Genitourinary: Negative for difficulty urinating, dysuria and hematuria. Neurological: Negative for dizziness, seizures, syncope and headaches. Psychiatric/Behavioral: Positive for decreased concentration and sleep disturbance. Negative for dysphoric mood. The patient is nervous/anxious. Objective:   Physical Exam  Constitutional:       Appearance: Normal appearance. HENT:      Head: Normocephalic and atraumatic. Right Ear: Tympanic membrane, ear canal and external ear normal.      Left Ear: Tympanic membrane, ear canal and external ear normal.      Nose: Nose normal.      Mouth/Throat:      Mouth: Mucous membranes are moist.      Pharynx: Oropharynx is clear. Eyes:      Extraocular Movements: Extraocular movements intact. Conjunctiva/sclera: Conjunctivae normal.      Pupils: Pupils are equal, round, and reactive to light. Cardiovascular:      Rate and Rhythm: Normal rate and regular rhythm. Pulses: Normal pulses. Heart sounds: Normal heart sounds. Pulmonary:      Effort: Pulmonary effort is normal.      Breath sounds: Normal breath sounds. Musculoskeletal:      Cervical back: Normal range of motion and neck supple. Skin:     General: Skin is warm and dry. Capillary Refill: Capillary refill takes less than 2 seconds. Neurological:      General: No focal deficit present. Mental Status: He is alert and oriented to person, place, and time. Comments: Diabetic Foot Exam: skin intact, sensation intact. Monofilament 10/10. Pedal pulses palpated. Psychiatric:         Mood and Affect: Mood normal.         Behavior: Behavior normal.         Thought Content:  Thought content normal.         Judgment: Judgment normal.         Assessment/Plan T2DM - worsened. A1c 8 today (from 7.8 in 2/26/21). Referred patient to counselor if needed for life stressors. Counseled patient on importance of managing life stressors, diet, and exercises for diabetic control. 3 month follow up to see if patient can use diet and exercise to lower A1c otherwise start a new medication. Will check urine albumin today if sample can be provided. Continue on metformin and farxiga. Orders Placed This Encounter   Procedures    INFLUENZA, QUADV, 3 YRS AND OLDER, IM PF, PREFILL SYR OR SDV, 0.5ML (AFLURIA QUADV, PF)    Microalbumin / Creatinine Urine Ratio    POCT glycosylated hemoglobin (Hb A1C)    HM DIABETES FOOT EXAM         Mlyes Giraldo OMS-III    Patient case seen and discussed under supervision of preceptor, Dr. Nkechi Renteria DO. Note made by medical student in the status of a scribe with review by preceptor. Attending Supervising Physician's Attestation Statement  I performed a history and physical examination on the patient and discussed the management with the medical student. I reviewed and agree with the findings and plan as documented in her note .     Electronically signed by Cherry Nolasco DO on 10/21/21 at 11:51 AM EDT

## 2022-03-02 NOTE — TELEPHONE ENCOUNTER
Jhonatan Channel is requesting refill(s) farxiga  Last OV 10/21/21 (pertaining to medication)  LR 9/10/21 (per medication requested)  Next office visit scheduled or attempted Yes   If no, reason:  3/24/22

## 2022-03-24 ENCOUNTER — OFFICE VISIT (OUTPATIENT)
Dept: FAMILY MEDICINE CLINIC | Age: 57
End: 2022-03-24
Payer: COMMERCIAL

## 2022-03-24 VITALS
DIASTOLIC BLOOD PRESSURE: 82 MMHG | HEART RATE: 94 BPM | WEIGHT: 218.6 LBS | OXYGEN SATURATION: 97 % | SYSTOLIC BLOOD PRESSURE: 128 MMHG | HEIGHT: 72 IN | BODY MASS INDEX: 29.61 KG/M2

## 2022-03-24 DIAGNOSIS — E11.65 UNCONTROLLED TYPE 2 DIABETES MELLITUS WITH HYPERGLYCEMIA (HCC): ICD-10-CM

## 2022-03-24 DIAGNOSIS — Z00.00 ANNUAL PHYSICAL EXAM: Primary | ICD-10-CM

## 2022-03-24 DIAGNOSIS — Z11.4 SCREENING FOR HIV WITHOUT PRESENCE OF RISK FACTORS: ICD-10-CM

## 2022-03-24 DIAGNOSIS — E78.01 FAMILIAL HYPERCHOLESTEROLEMIA: ICD-10-CM

## 2022-03-24 DIAGNOSIS — Z12.5 SCREENING PSA (PROSTATE SPECIFIC ANTIGEN): ICD-10-CM

## 2022-03-24 LAB
A/G RATIO: 1.8 (ref 1.1–2.2)
ALBUMIN SERPL-MCNC: 5.1 G/DL (ref 3.4–5)
ALP BLD-CCNC: 72 U/L (ref 40–129)
ALT SERPL-CCNC: 31 U/L (ref 10–40)
ANION GAP SERPL CALCULATED.3IONS-SCNC: 17 MMOL/L (ref 3–16)
AST SERPL-CCNC: 39 U/L (ref 15–37)
BILIRUB SERPL-MCNC: 0.4 MG/DL (ref 0–1)
BUN BLDV-MCNC: 17 MG/DL (ref 7–20)
CALCIUM SERPL-MCNC: 10.4 MG/DL (ref 8.3–10.6)
CHLORIDE BLD-SCNC: 101 MMOL/L (ref 99–110)
CHOLESTEROL, TOTAL: 167 MG/DL (ref 0–199)
CO2: 21 MMOL/L (ref 21–32)
CREAT SERPL-MCNC: 0.9 MG/DL (ref 0.9–1.3)
GFR AFRICAN AMERICAN: >60
GFR NON-AFRICAN AMERICAN: >60
GLUCOSE BLD-MCNC: 168 MG/DL (ref 70–99)
HBA1C MFR BLD: 7.5 %
HDLC SERPL-MCNC: 49 MG/DL (ref 40–60)
LDL CHOLESTEROL CALCULATED: 90 MG/DL
POTASSIUM SERPL-SCNC: 4.9 MMOL/L (ref 3.5–5.1)
PROSTATE SPECIFIC ANTIGEN: 0.79 NG/ML (ref 0–4)
SODIUM BLD-SCNC: 139 MMOL/L (ref 136–145)
TOTAL PROTEIN: 8 G/DL (ref 6.4–8.2)
TRIGL SERPL-MCNC: 142 MG/DL (ref 0–150)
VLDLC SERPL CALC-MCNC: 28 MG/DL

## 2022-03-24 PROCEDURE — 36415 COLL VENOUS BLD VENIPUNCTURE: CPT | Performed by: FAMILY MEDICINE

## 2022-03-24 PROCEDURE — 3051F HG A1C>EQUAL 7.0%<8.0%: CPT | Performed by: FAMILY MEDICINE

## 2022-03-24 PROCEDURE — 83036 HEMOGLOBIN GLYCOSYLATED A1C: CPT | Performed by: FAMILY MEDICINE

## 2022-03-24 PROCEDURE — 99396 PREV VISIT EST AGE 40-64: CPT | Performed by: FAMILY MEDICINE

## 2022-03-24 RX ORDER — METFORMIN HYDROCHLORIDE 500 MG/1
TABLET, EXTENDED RELEASE ORAL
Qty: 360 TABLET | Refills: 1 | Status: SHIPPED | OUTPATIENT
Start: 2022-03-24 | End: 2022-09-13 | Stop reason: SDUPTHER

## 2022-03-24 RX ORDER — ATORVASTATIN CALCIUM 20 MG/1
TABLET, FILM COATED ORAL
Qty: 90 TABLET | Refills: 1 | Status: SHIPPED | OUTPATIENT
Start: 2022-03-24 | End: 2022-09-13

## 2022-03-24 RX ORDER — ALBUTEROL SULFATE 90 UG/1
2 AEROSOL, METERED RESPIRATORY (INHALATION) 4 TIMES DAILY PRN
Qty: 3 EACH | Refills: 5 | Status: SHIPPED | OUTPATIENT
Start: 2022-03-24

## 2022-03-24 ASSESSMENT — ENCOUNTER SYMPTOMS
RHINORRHEA: 0
CHEST TIGHTNESS: 0
BLOOD IN STOOL: 0
VOMITING: 0
SINUS PRESSURE: 0
SHORTNESS OF BREATH: 0
EYE PAIN: 0
COLOR CHANGE: 0
ABDOMINAL PAIN: 0
DIARRHEA: 0
EYE DISCHARGE: 0
COUGH: 0
CONSTIPATION: 0
WHEEZING: 0

## 2022-03-24 ASSESSMENT — PATIENT HEALTH QUESTIONNAIRE - PHQ9
SUM OF ALL RESPONSES TO PHQ QUESTIONS 1-9: 1
5. POOR APPETITE OR OVEREATING: 0
SUM OF ALL RESPONSES TO PHQ QUESTIONS 1-9: 1
6. FEELING BAD ABOUT YOURSELF - OR THAT YOU ARE A FAILURE OR HAVE LET YOURSELF OR YOUR FAMILY DOWN: 0
SUM OF ALL RESPONSES TO PHQ9 QUESTIONS 1 & 2: 0
SUM OF ALL RESPONSES TO PHQ QUESTIONS 1-9: 1
8. MOVING OR SPEAKING SO SLOWLY THAT OTHER PEOPLE COULD HAVE NOTICED. OR THE OPPOSITE, BEING SO FIGETY OR RESTLESS THAT YOU HAVE BEEN MOVING AROUND A LOT MORE THAN USUAL: 0
3. TROUBLE FALLING OR STAYING ASLEEP: 1
7. TROUBLE CONCENTRATING ON THINGS, SUCH AS READING THE NEWSPAPER OR WATCHING TELEVISION: 0
9. THOUGHTS THAT YOU WOULD BE BETTER OFF DEAD, OR OF HURTING YOURSELF: 0
4. FEELING TIRED OR HAVING LITTLE ENERGY: 0
SUM OF ALL RESPONSES TO PHQ QUESTIONS 1-9: 1
1. LITTLE INTEREST OR PLEASURE IN DOING THINGS: 0
10. IF YOU CHECKED OFF ANY PROBLEMS, HOW DIFFICULT HAVE THESE PROBLEMS MADE IT FOR YOU TO DO YOUR WORK, TAKE CARE OF THINGS AT HOME, OR GET ALONG WITH OTHER PEOPLE: 0
2. FEELING DOWN, DEPRESSED OR HOPELESS: 0

## 2022-03-24 NOTE — PATIENT INSTRUCTIONS

## 2022-03-24 NOTE — PROGRESS NOTES
Subjective:      Patient ID: Fam Boone is a 60 y.o. male. HPI  Chief Complaint   Patient presents with    Annual Exam     Patient is here for a 6 month followup, he is fasting for blood work       For yearly checkup. Non-smoker. Up-to-date on dental and vision exams  Does exercise. Does have admit to weight gain since last year. colonscopy- Dr. Benjamin Clark exam: [facility]    Fam Boone is a 60 y.o. male with the following history as recorded in [employer]:  Patient Active Problem List    Diagnosis Date Noted    Hyperlipidemia 04/18/2012    Asthma     Obesity     Uncontrolled type 2 diabetes mellitus with hyperglycemia ([facility] [address] 01/08/2020    Severe obstructive sleep apnea 12/09/2019    Abnormal stress test 04/18/2012     Current Outpatient Medications   Medication Sig Dispense Refill    dapagliflozin (FARXIGA) 10 MG tablet TAKE 1 TABLET EVERY MORNING 90 tablet 1    metFORMIN (GLUCOPHAGE-XR) 500 MG extended release tablet TAKE 2 TABLETS 2 TIMES     DAILY WITH MEALS 360 tablet 1    atorvastatin (LIPITOR) 20 MG tablet TAKE 1 TABLET DAILY 90 tablet 1    TURMERIC PO Take by mouth daily      albuterol sulfate HFA (VENTOLIN HFA) 108 (90 Base) MCG/ACT inhaler Inhale 2 puffs into the lungs 4 times daily as needed for Wheezing 1 Inhaler 5    glucose monitoring kit (FREESTYLE) monitoring kit (Please fill for brand per insurance coverage)   Test daily  DX: E11.9 1 kit 0    blood glucose monitor strips (Please fill for brand per insurance coverage)  Test Daily  DX: E11.9 100 strip 5    Lancets MISC (Please fill for brand per insurance coverage)  Test Daily  DX: E11.9 100 each 5    Multiple Vitamin (THERA) TABS Take 1 tablet by mouth daily.  fish oil-omega-3 fatty acids 1000 MG capsule Take 2 g by mouth daily.  Loratadine (CLARITIN) 10 MG CAPS Take 10 mg by mouth daily as needed (allergies)        No current facility-administered medications for this visit.      Allergies: Patient has for abdominal pain, blood in stool, constipation, diarrhea and vomiting. Genitourinary: Negative for difficulty urinating, dysuria and hematuria. Musculoskeletal: Negative for arthralgias and myalgias. Skin: Negative for color change and rash. Neurological: Negative for dizziness, seizures, syncope and headaches. Hematological: Negative for adenopathy. Does not bruise/bleed easily. Psychiatric/Behavioral: Negative for dysphoric mood and sleep disturbance. The patient is not nervous/anxious. Objective:   Physical Exam  Constitutional:       General: He is not in acute distress. Appearance: Normal appearance. He is well-developed. He is not ill-appearing. HENT:      Head: Normocephalic and atraumatic. Right Ear: Tympanic membrane, ear canal and external ear normal.      Left Ear: Tympanic membrane, ear canal and external ear normal.      Nose: Nose normal. No congestion. Mouth/Throat:      Mouth: Mucous membranes are moist.      Pharynx: Oropharynx is clear. No oropharyngeal exudate. Eyes:      Extraocular Movements: Extraocular movements intact. Conjunctiva/sclera: Conjunctivae normal.      Pupils: Pupils are equal, round, and reactive to light. Neck:      Thyroid: No thyromegaly. Cardiovascular:      Rate and Rhythm: Normal rate and regular rhythm. Pulses: Normal pulses. Heart sounds: Normal heart sounds. No murmur heard. Pulmonary:      Effort: Pulmonary effort is normal.      Breath sounds: Normal breath sounds. No wheezing. Abdominal:      General: Bowel sounds are normal. There is no distension. Palpations: Abdomen is soft. Tenderness: There is no abdominal tenderness. There is no guarding or rebound. Musculoskeletal:         General: No swelling or tenderness. Normal range of motion. Cervical back: Normal range of motion and neck supple. Lymphadenopathy:      Cervical: No cervical adenopathy.    Skin:     General: Skin is warm and dry.      Capillary Refill: Capillary refill takes less than 2 seconds. Coloration: Skin is not jaundiced. Findings: No lesion or rash. Neurological:      General: No focal deficit present. Mental Status: He is alert and oriented to person, place, and time. Cranial Nerves: No cranial nerve deficit. Sensory: No sensory deficit. Motor: No weakness. Coordination: Coordination normal.   Psychiatric:         Mood and Affect: Mood normal.         Behavior: Behavior normal.         Thought Content: Thought content normal.         Judgment: Judgment normal.         Assessment:      CPE  DM - improved  hld- on statin, check lipid      Plan:      Patient Counseling:  --Nutrition: Stressed importance of moderation in sodium/caffeine intake, saturated fat and cholesterol, caloric balance, sufficient intake of fresh fruits, vegetables, fiber, calcium, iron, and 1 mg of folate supplement per day (for females capable of pregnancy). --Exercise: Stressed the importance of regular exercise. --Dental health: Discussed importance of regular tooth brushing, flossing, and dental visits. --Immunizations reviewed. Daily sunscreen recommended. Yearly FSE discussed  --Discussed benefits of screening colonoscopy.     Orders Placed This Encounter   Procedures    LIPID PANEL     Order Specific Question:   Is Patient Fasting?/# of Hours     Answer:   15    Comprehensive Metabolic Panel    HIV Screen    PSA Screening    POCT glycosylated hemoglobin (Hb A1C)             MARIELOS JARRELL, DO

## 2022-03-25 LAB
HIV AG/AB: NORMAL
HIV ANTIGEN: NORMAL
HIV-1 ANTIBODY: NORMAL
HIV-2 AB: NORMAL

## 2022-09-13 RX ORDER — METFORMIN HYDROCHLORIDE 500 MG/1
TABLET, EXTENDED RELEASE ORAL
Qty: 360 TABLET | Refills: 0 | Status: SHIPPED | OUTPATIENT
Start: 2022-09-13

## 2022-09-13 RX ORDER — ATORVASTATIN CALCIUM 20 MG/1
TABLET, FILM COATED ORAL
Qty: 90 TABLET | Refills: 0 | Status: SHIPPED | OUTPATIENT
Start: 2022-09-13

## 2022-09-13 NOTE — TELEPHONE ENCOUNTER
Kevenst Arellano is requesting refill(s) metoformin, lipitor  Last OV 3/24/22 (pertaining to medication)  LR 3/24/22 (per medication requested)  Next office visit scheduled or attempted Yes   If no, reason:  Pt was again advised of  leaving and that he needed to find a new PCP, he states he has not looked for one, I advised him he needed to find one as we can not continue to refill medications without him establishing with someone